# Patient Record
Sex: FEMALE | Race: ASIAN | NOT HISPANIC OR LATINO
[De-identification: names, ages, dates, MRNs, and addresses within clinical notes are randomized per-mention and may not be internally consistent; named-entity substitution may affect disease eponyms.]

---

## 2018-10-05 ENCOUNTER — RESULT REVIEW (OUTPATIENT)
Age: 28
End: 2018-10-05

## 2018-11-26 ENCOUNTER — TRANSCRIPTION ENCOUNTER (OUTPATIENT)
Age: 28
End: 2018-11-26

## 2020-03-11 ENCOUNTER — APPOINTMENT (OUTPATIENT)
Dept: OBGYN | Facility: CLINIC | Age: 30
End: 2020-03-11
Payer: COMMERCIAL

## 2020-03-11 VITALS
DIASTOLIC BLOOD PRESSURE: 78 MMHG | HEIGHT: 66 IN | SYSTOLIC BLOOD PRESSURE: 102 MMHG | WEIGHT: 115 LBS | BODY MASS INDEX: 18.48 KG/M2

## 2020-03-11 DIAGNOSIS — Z86.79 PERSONAL HISTORY OF OTHER DISEASES OF THE CIRCULATORY SYSTEM: ICD-10-CM

## 2020-03-11 DIAGNOSIS — Z72.3 LACK OF PHYSICAL EXERCISE: ICD-10-CM

## 2020-03-11 LAB
HCG UR QL: POSITIVE
QUALITY CONTROL: YES

## 2020-03-11 PROCEDURE — 99213 OFFICE O/P EST LOW 20 MIN: CPT | Mod: 25

## 2020-03-11 PROCEDURE — 99395 PREV VISIT EST AGE 18-39: CPT

## 2020-03-11 PROCEDURE — 81025 URINE PREGNANCY TEST: CPT

## 2020-03-11 NOTE — COUNSELING
[FreeTextEntry2] : Dietary and medicaiton restrictions were reviewed.  Pt and  oriented to OB practice. Call parameters were reviewed

## 2020-03-12 LAB
C TRACH RRNA SPEC QL NAA+PROBE: NOT DETECTED
HPV HIGH+LOW RISK DNA PNL CVX: NOT DETECTED
N GONORRHOEA RRNA SPEC QL NAA+PROBE: NOT DETECTED
SOURCE AMPLIFICATION: NORMAL
SOURCE TP AMPLIFICATION: NORMAL
T VAGINALIS RRNA SPEC QL NAA+PROBE: NOT DETECTED

## 2020-03-14 LAB — CYTOLOGY CVX/VAG DOC THIN PREP: NORMAL

## 2020-03-23 ENCOUNTER — APPOINTMENT (OUTPATIENT)
Dept: OBGYN | Facility: CLINIC | Age: 30
End: 2020-03-23
Payer: COMMERCIAL

## 2020-03-23 ENCOUNTER — ASOB RESULT (OUTPATIENT)
Age: 30
End: 2020-03-23

## 2020-03-23 ENCOUNTER — NON-APPOINTMENT (OUTPATIENT)
Age: 30
End: 2020-03-23

## 2020-03-23 VITALS
DIASTOLIC BLOOD PRESSURE: 64 MMHG | SYSTOLIC BLOOD PRESSURE: 126 MMHG | WEIGHT: 119 LBS | HEIGHT: 66 IN | BODY MASS INDEX: 19.13 KG/M2

## 2020-03-23 LAB
BILIRUB UR QL STRIP: NORMAL
CLARITY UR: CLEAR
COLLECTION METHOD: NORMAL
GLUCOSE BLDC GLUCOMTR-MCNC: 101
GLUCOSE UR-MCNC: ABNORMAL
HCG UR QL: 0.2 EU/DL
HGB UR QL STRIP.AUTO: NORMAL
KETONES UR-MCNC: NORMAL
LEUKOCYTE ESTERASE UR QL STRIP: NORMAL
NITRITE UR QL STRIP: NORMAL
PH UR STRIP: 5
PROT UR STRIP-MCNC: NORMAL
SP GR UR STRIP: 1

## 2020-03-23 PROCEDURE — 0500F INITIAL PRENATAL CARE VISIT: CPT

## 2020-03-23 PROCEDURE — 36415 COLL VENOUS BLD VENIPUNCTURE: CPT

## 2020-03-23 PROCEDURE — 81003 URINALYSIS AUTO W/O SCOPE: CPT | Mod: QW

## 2020-03-23 PROCEDURE — 82962 GLUCOSE BLOOD TEST: CPT

## 2020-03-23 PROCEDURE — 76817 TRANSVAGINAL US OBSTETRIC: CPT

## 2020-03-24 LAB
BASOPHILS # BLD AUTO: 0.03 K/UL
BASOPHILS NFR BLD AUTO: 0.4 %
EOSINOPHIL # BLD AUTO: 0.1 K/UL
EOSINOPHIL NFR BLD AUTO: 1.2 %
ESTIMATED AVERAGE GLUCOSE: 100 MG/DL
HBA1C MFR BLD HPLC: 5.1 %
HBV SURFACE AG SER QL: NONREACTIVE
HCT VFR BLD CALC: 41.3 %
HGB BLD-MCNC: 13.8 G/DL
HIV1+2 AB SPEC QL IA.RAPID: NONREACTIVE
IMM GRANULOCYTES NFR BLD AUTO: 0.1 %
LYMPHOCYTES # BLD AUTO: 2.46 K/UL
LYMPHOCYTES NFR BLD AUTO: 28.8 %
MAN DIFF?: NORMAL
MCHC RBC-ENTMCNC: 31.9 PG
MCHC RBC-ENTMCNC: 33.4 GM/DL
MCV RBC AUTO: 95.4 FL
MONOCYTES # BLD AUTO: 0.67 K/UL
MONOCYTES NFR BLD AUTO: 7.8 %
NEUTROPHILS # BLD AUTO: 5.28 K/UL
NEUTROPHILS NFR BLD AUTO: 61.7 %
PLATELET # BLD AUTO: 278 K/UL
RBC # BLD: 4.33 M/UL
RBC # FLD: 11.6 %
TSH SERPL-ACNC: 2.25 UIU/ML
WBC # FLD AUTO: 8.55 K/UL

## 2020-03-25 LAB
ABO + RH PNL BLD: NORMAL
B19V IGG SER QL IA: 3.8 INDEX
B19V IGG+IGM SER-IMP: NORMAL
B19V IGG+IGM SER-IMP: POSITIVE
B19V IGM FLD-ACNC: 0.1
B19V IGM SER-ACNC: NEGATIVE
BLD GP AB SCN SERPL QL: NORMAL
CMV IGG SERPL QL: 1.5 U/ML
CMV IGG SERPL-IMP: POSITIVE
CMV IGM SERPL QL: <8 AU/ML
CMV IGM SERPL QL: NEGATIVE
HGB A MFR BLD: 97.2 %
HGB A2 MFR BLD: 2.8 %
HGB FRACT BLD-IMP: NORMAL
MEV IGG FLD QL IA: >300 AU/ML
MEV IGG+IGM SER-IMP: POSITIVE
RUBV IGG FLD-ACNC: 1.2 INDEX
RUBV IGG SER-IMP: POSITIVE
T GONDII AB SER-IMP: NEGATIVE
T GONDII AB SER-IMP: NEGATIVE
T GONDII IGG SER QL: <3 IU/ML
T GONDII IGM SER QL: <3 AU/ML
T PALLIDUM AB SER QL IA: NEGATIVE

## 2020-03-30 LAB
AR GENE MUT ANL BLD/T: NEGATIVE
CFTR MUT TESTED BLD/T: NEGATIVE
FMR1 GENE MUT ANL BLD/T: NORMAL

## 2020-04-21 ENCOUNTER — APPOINTMENT (OUTPATIENT)
Dept: OBGYN | Facility: CLINIC | Age: 30
End: 2020-04-21
Payer: COMMERCIAL

## 2020-04-21 ENCOUNTER — NON-APPOINTMENT (OUTPATIENT)
Age: 30
End: 2020-04-21

## 2020-04-21 ENCOUNTER — ASOB RESULT (OUTPATIENT)
Age: 30
End: 2020-04-21

## 2020-04-21 VITALS
WEIGHT: 122 LBS | HEIGHT: 66 IN | SYSTOLIC BLOOD PRESSURE: 110 MMHG | DIASTOLIC BLOOD PRESSURE: 72 MMHG | BODY MASS INDEX: 19.61 KG/M2

## 2020-04-21 LAB
BILIRUB UR QL STRIP: NORMAL
CLARITY UR: CLEAR
COLLECTION METHOD: NORMAL
GLUCOSE UR-MCNC: NORMAL
HCG UR QL: 0.2 EU/DL
HGB UR QL STRIP.AUTO: NORMAL
KETONES UR-MCNC: NORMAL
LEUKOCYTE ESTERASE UR QL STRIP: ABNORMAL
NITRITE UR QL STRIP: NORMAL
PH UR STRIP: 5
PROT UR STRIP-MCNC: NORMAL
SP GR UR STRIP: 1

## 2020-04-21 PROCEDURE — 76813 OB US NUCHAL MEAS 1 GEST: CPT

## 2020-04-21 PROCEDURE — 36415 COLL VENOUS BLD VENIPUNCTURE: CPT

## 2020-04-21 PROCEDURE — 0502F SUBSEQUENT PRENATAL CARE: CPT

## 2020-04-24 ENCOUNTER — TRANSCRIPTION ENCOUNTER (OUTPATIENT)
Age: 30
End: 2020-04-24

## 2020-04-26 ENCOUNTER — MESSAGE (OUTPATIENT)
Age: 30
End: 2020-04-26

## 2020-04-29 LAB
1ST TRIMESTER DATA: NORMAL
ADDENDUM DOC: NORMAL
AFP PNL SERPL: NORMAL
AFP SERPL-ACNC: NORMAL
CLINICAL BIOCHEMIST REVIEW: NORMAL
FREE BETA HCG 1ST TRIMESTER: NORMAL
Lab: NORMAL
NASAL BONE: PRESENT
NOTES NTD: NORMAL
NT: NORMAL
PAPP-A SERPL-ACNC: NORMAL
TRISOMY 18/3: NORMAL

## 2020-05-01 ENCOUNTER — APPOINTMENT (OUTPATIENT)
Dept: DISASTER EMERGENCY | Facility: HOSPITAL | Age: 30
End: 2020-05-01

## 2020-05-07 ENCOUNTER — APPOINTMENT (OUTPATIENT)
Dept: MATERNAL FETAL MEDICINE | Facility: CLINIC | Age: 30
End: 2020-05-07
Payer: COMMERCIAL

## 2020-05-07 ENCOUNTER — APPOINTMENT (OUTPATIENT)
Dept: ANTEPARTUM | Facility: CLINIC | Age: 30
End: 2020-05-07

## 2020-05-07 VITALS
WEIGHT: 123.38 LBS | OXYGEN SATURATION: 98 % | SYSTOLIC BLOOD PRESSURE: 118 MMHG | BODY MASS INDEX: 19.83 KG/M2 | HEIGHT: 66 IN | RESPIRATION RATE: 16 BRPM | DIASTOLIC BLOOD PRESSURE: 68 MMHG | HEART RATE: 89 BPM

## 2020-05-07 VITALS — TEMPERATURE: 98.1 F

## 2020-05-07 PROCEDURE — 99203 OFFICE O/P NEW LOW 30 MIN: CPT

## 2020-05-07 RX ORDER — VITAMIN C, CALCIUM, IRON, VITAMIN D3, VITAMIN E, VITAMIN B1, VITAMIN B2, VITAMIN B3, VITAMIN B6, FOLIC ACID, IODINE, ZINC, COPPER, DOCUSATE SODIUM, DOCOSAHEXAENOIC ACID (DHA) 27-1-50 MG
KIT ORAL
Refills: 0 | Status: ACTIVE | COMMUNITY

## 2020-05-07 NOTE — DISCUSSION/SUMMARY
[FreeTextEntry1] : Medina is a 30-year-old  being seen at 15 weeks with chronic hypertension. Patient was diagnosed approximately 12 years ago and had been on Norvasc prior to pregnancy. Labetalol was tried with side effects and she is on Procardia 30 mg XL daily.\par \par Ultrasound and first trimester testing was performed on  and revealed low risk for genetic abnormalities and ultrasound finding of size consistent with dates. Vital signs today revealed a blood pressure of 118/68, maternal weight is 123.6 pounds which is a 4 1/2 pound weight gain from the evaluation done on , consistent with a BMI of 19.91 KG.\par \par Evaluation of the patient's blood pressure on multiple visits to your office showed her blood pressure running between 102-126/64-78. During pregnancy in patient with chronic hypertension blood pressures should be between 120 140/80-90. We will discontinue her Procardia 30 mg XL daily and start her on 10 mg of Procardia twice a day. She will call the office next week with her blood pressure values done with adjustments made if clinically indicated.  Baseline blood work including CBC with platelet count, uric acid, liver function tests and urine creatinine protein ratio should be done at approximately 20 weeks. A comprehensive ultrasound at 20 weeks is also recommended. Serial growth scans every 4-6 weeks after the comprehensive ultrasound will be recommended. The patient understands that she is at increased risk for poor fetal growth. In addition the possibility of superimposed preeclampsia was discussed with possible  delivery with associated morbidity and mortaliy. The option of low dose aspirin was also discussed. Low dose ASA has been studied in patients with history of preeclampsia with associated  delivery. The efficacy of this medication in a patient with this clinical history is questionable. Patient states she may take one low dose aspirin daily anyway and if she should start that medication discontinuation at approximately 38 weeks would be recommended. Signs and symptoms of superimposed preeclampsia were discussed with the patient and will be reinforced at the end of the pregnancy. All of the above was discussed with the patient and all questions were answered.\par \par The patient is adopted. She has had LASIK eye surgery. She has no known allergies to medications and denies alcohol, tobacco or drug use.\par \par I spent a total of 30 minutes of which greater than 50% was counseling and coordinating care.\par \par Recommendations;\par \par #1. Procardia 10 mg p.o. q.12 h.\par #2. Baseline blood work and urine creatinine protein ratio 20 weeks is recommended.\par #3. Comprehensive ultrasound at 20 weeks is recommended.\par #4. Serial growth scans every 4-6 weeks after the comprehensive ultrasound was recommended.\par #5. Three-hour glucose tolerance test between 24 and 28 weeks secondary to unknown family history.\par #6. Followup maternal fetal medicine consultation at 28 weeks is recommended.

## 2020-05-19 ENCOUNTER — APPOINTMENT (OUTPATIENT)
Dept: OBGYN | Facility: CLINIC | Age: 30
End: 2020-05-19
Payer: COMMERCIAL

## 2020-05-19 ENCOUNTER — NON-APPOINTMENT (OUTPATIENT)
Age: 30
End: 2020-05-19

## 2020-05-19 VITALS
HEIGHT: 66 IN | WEIGHT: 126 LBS | DIASTOLIC BLOOD PRESSURE: 80 MMHG | BODY MASS INDEX: 20.25 KG/M2 | SYSTOLIC BLOOD PRESSURE: 118 MMHG

## 2020-05-19 LAB
BILIRUB UR QL STRIP: NORMAL
GLUCOSE UR-MCNC: NORMAL
HCG UR QL: 0.2 EU/DL
HGB UR QL STRIP.AUTO: NORMAL
KETONES UR-MCNC: NORMAL
LEUKOCYTE ESTERASE UR QL STRIP: ABNORMAL
NITRITE UR QL STRIP: NORMAL
PH UR STRIP: 5
PROT UR STRIP-MCNC: NORMAL
SP GR UR STRIP: 1

## 2020-05-19 PROCEDURE — 0502F SUBSEQUENT PRENATAL CARE: CPT

## 2020-05-19 PROCEDURE — 36415 COLL VENOUS BLD VENIPUNCTURE: CPT

## 2020-05-29 LAB
1ST TRIMESTER DATA: NORMAL
2ND TRIMESTER DATA: NORMAL
AFP PNL SERPL: NORMAL
AFP SERPL-ACNC: NORMAL
AFP SERPL-ACNC: NORMAL
B-HCG FREE SERPL-MCNC: NORMAL
CLINICAL BIOCHEMIST REVIEW: NORMAL
FREE BETA HCG 1ST TRIMESTER: NORMAL
INHIBIN A SERPL-MCNC: NORMAL
NASAL BONE: PRESENT
NOTES NTD: NORMAL
NT: NORMAL
PAPP-A SERPL-ACNC: NORMAL
U ESTRIOL SERPL-SCNC: NORMAL

## 2020-06-08 ENCOUNTER — ASOB RESULT (OUTPATIENT)
Age: 30
End: 2020-06-08

## 2020-06-08 ENCOUNTER — APPOINTMENT (OUTPATIENT)
Dept: ANTEPARTUM | Facility: CLINIC | Age: 30
End: 2020-06-08
Payer: COMMERCIAL

## 2020-06-08 PROCEDURE — 76811 OB US DETAILED SNGL FETUS: CPT

## 2020-06-16 ENCOUNTER — APPOINTMENT (OUTPATIENT)
Dept: OBGYN | Facility: CLINIC | Age: 30
End: 2020-06-16
Payer: COMMERCIAL

## 2020-06-16 VITALS
DIASTOLIC BLOOD PRESSURE: 72 MMHG | SYSTOLIC BLOOD PRESSURE: 102 MMHG | WEIGHT: 133 LBS | HEIGHT: 66 IN | BODY MASS INDEX: 21.38 KG/M2

## 2020-06-16 LAB
SARS-COV-2 IGG SERPL IA-ACNC: <0.1 INDEX
SARS-COV-2 IGG SERPL QL IA: NEGATIVE

## 2020-06-16 PROCEDURE — 0502F SUBSEQUENT PRENATAL CARE: CPT

## 2020-06-27 LAB
BILIRUB UR QL STRIP: NORMAL
COLLECTION METHOD: NORMAL
GLUCOSE UR-MCNC: NORMAL
HCG UR QL: 0.2 EU/DL
HGB UR QL STRIP.AUTO: NORMAL
KETONES UR-MCNC: ABNORMAL
LEUKOCYTE ESTERASE UR QL STRIP: ABNORMAL
NITRITE UR QL STRIP: NORMAL
PH UR STRIP: 5.5
PROT UR STRIP-MCNC: NORMAL
SP GR UR STRIP: 1.01

## 2020-07-17 ENCOUNTER — NON-APPOINTMENT (OUTPATIENT)
Age: 30
End: 2020-07-17

## 2020-07-17 ENCOUNTER — APPOINTMENT (OUTPATIENT)
Dept: OBGYN | Facility: CLINIC | Age: 30
End: 2020-07-17
Payer: COMMERCIAL

## 2020-07-17 VITALS
DIASTOLIC BLOOD PRESSURE: 64 MMHG | SYSTOLIC BLOOD PRESSURE: 118 MMHG | HEIGHT: 66 IN | BODY MASS INDEX: 21.69 KG/M2 | WEIGHT: 135 LBS

## 2020-07-17 PROCEDURE — 0502F SUBSEQUENT PRENATAL CARE: CPT

## 2020-08-10 ENCOUNTER — APPOINTMENT (OUTPATIENT)
Dept: ANTEPARTUM | Facility: CLINIC | Age: 30
End: 2020-08-10

## 2020-08-17 ENCOUNTER — APPOINTMENT (OUTPATIENT)
Dept: ANTEPARTUM | Facility: CLINIC | Age: 30
End: 2020-08-17
Payer: COMMERCIAL

## 2020-08-17 ENCOUNTER — APPOINTMENT (OUTPATIENT)
Dept: OBGYN | Facility: CLINIC | Age: 30
End: 2020-08-17
Payer: COMMERCIAL

## 2020-08-17 ENCOUNTER — ASOB RESULT (OUTPATIENT)
Age: 30
End: 2020-08-17

## 2020-08-17 ENCOUNTER — NON-APPOINTMENT (OUTPATIENT)
Age: 30
End: 2020-08-17

## 2020-08-17 VITALS
BODY MASS INDEX: 22.98 KG/M2 | DIASTOLIC BLOOD PRESSURE: 64 MMHG | SYSTOLIC BLOOD PRESSURE: 120 MMHG | WEIGHT: 143 LBS | HEIGHT: 66 IN

## 2020-08-17 DIAGNOSIS — Z34.01 ENCOUNTER FOR SUPERVISION OF NORMAL FIRST PREGNANCY, FIRST TRIMESTER: ICD-10-CM

## 2020-08-17 DIAGNOSIS — Z3A.13 13 WEEKS GESTATION OF PREGNANCY: ICD-10-CM

## 2020-08-17 LAB
BILIRUB UR QL STRIP: NORMAL
COLLECTION METHOD: NORMAL
GLUCOSE UR-MCNC: NORMAL
HCG UR QL: 0.2 EU/DL
HGB UR QL STRIP.AUTO: NORMAL
KETONES UR-MCNC: NORMAL
LEUKOCYTE ESTERASE UR QL STRIP: ABNORMAL
NITRITE UR QL STRIP: NORMAL
PH UR STRIP: 7
PROT UR STRIP-MCNC: NORMAL
SP GR UR STRIP: 1.01

## 2020-08-17 PROCEDURE — 76816 OB US FOLLOW-UP PER FETUS: CPT

## 2020-08-17 PROCEDURE — 81002 URINALYSIS NONAUTO W/O SCOPE: CPT | Mod: NC

## 2020-08-17 PROCEDURE — 0502F SUBSEQUENT PRENATAL CARE: CPT

## 2020-08-17 PROCEDURE — 76820 UMBILICAL ARTERY ECHO: CPT

## 2020-08-17 PROCEDURE — 36415 COLL VENOUS BLD VENIPUNCTURE: CPT

## 2020-08-17 PROCEDURE — 93976 VASCULAR STUDY: CPT

## 2020-08-18 ENCOUNTER — NON-APPOINTMENT (OUTPATIENT)
Age: 30
End: 2020-08-18

## 2020-08-18 LAB
ALBUMIN SERPL ELPH-MCNC: 3.8 G/DL
ALP BLD-CCNC: 57 U/L
ALT SERPL-CCNC: 14 U/L
ANION GAP SERPL CALC-SCNC: 14 MMOL/L
AST SERPL-CCNC: 15 U/L
BASOPHILS # BLD AUTO: 0.01 K/UL
BASOPHILS NFR BLD AUTO: 0.1 %
BILIRUB SERPL-MCNC: <0.2 MG/DL
BUN SERPL-MCNC: 8 MG/DL
CALCIUM SERPL-MCNC: 8.8 MG/DL
CHLORIDE SERPL-SCNC: 101 MMOL/L
CO2 SERPL-SCNC: 23 MMOL/L
CREAT SERPL-MCNC: 0.48 MG/DL
EOSINOPHIL # BLD AUTO: 0.04 K/UL
EOSINOPHIL NFR BLD AUTO: 0.5 %
GLUCOSE 1H P 50 G GLC PO SERPL-MCNC: 119 MG/DL
GLUCOSE SERPL-MCNC: 91 MG/DL
HCT VFR BLD CALC: 39.1 %
HGB BLD-MCNC: 12.4 G/DL
IMM GRANULOCYTES NFR BLD AUTO: 1 %
LDH SERPL-CCNC: 207 U/L
LYMPHOCYTES # BLD AUTO: 1.4 K/UL
LYMPHOCYTES NFR BLD AUTO: 17 %
MAN DIFF?: NORMAL
MCHC RBC-ENTMCNC: 31.7 GM/DL
MCHC RBC-ENTMCNC: 32.2 PG
MCV RBC AUTO: 101.6 FL
MONOCYTES # BLD AUTO: 0.63 K/UL
MONOCYTES NFR BLD AUTO: 7.7 %
NEUTROPHILS # BLD AUTO: 6.06 K/UL
NEUTROPHILS NFR BLD AUTO: 73.7 %
PLATELET # BLD AUTO: 234 K/UL
POTASSIUM SERPL-SCNC: 4.1 MMOL/L
PROT SERPL-MCNC: 5.9 G/DL
RBC # BLD: 3.85 M/UL
RBC # FLD: 12.2 %
SODIUM SERPL-SCNC: 138 MMOL/L
URATE SERPL-MCNC: 1.6 MG/DL
WBC # FLD AUTO: 8.22 K/UL

## 2020-08-19 LAB
CREAT SPEC-SCNC: 9 MG/DL
CREAT/PROT UR: 0.7 RATIO
PROT UR-MCNC: 7 MG/DL

## 2020-08-27 LAB
CREAT 24H UR-MCNC: 0.4 G/24 H
CREAT ?TM UR-MCNC: 29 MG/DL
PROT 24H UR-MRATE: 8 MG/DL
PROT ?TM UR-MCNC: 24 HR
PROT UR-MCNC: 108 MG/24 H
SPECIMEN VOL 24H UR: 1350 ML

## 2020-08-31 ENCOUNTER — APPOINTMENT (OUTPATIENT)
Dept: OBGYN | Facility: CLINIC | Age: 30
End: 2020-08-31
Payer: COMMERCIAL

## 2020-08-31 ENCOUNTER — NON-APPOINTMENT (OUTPATIENT)
Age: 30
End: 2020-08-31

## 2020-08-31 VITALS
DIASTOLIC BLOOD PRESSURE: 64 MMHG | SYSTOLIC BLOOD PRESSURE: 110 MMHG | WEIGHT: 146 LBS | BODY MASS INDEX: 23.46 KG/M2 | HEIGHT: 66 IN

## 2020-08-31 LAB
BILIRUB UR QL STRIP: NORMAL
GLUCOSE UR-MCNC: NORMAL
HCG UR QL: 0.2 EU/DL
HGB UR QL STRIP.AUTO: NORMAL
KETONES UR-MCNC: NORMAL
LEUKOCYTE ESTERASE UR QL STRIP: NORMAL
NITRITE UR QL STRIP: NORMAL
PH UR STRIP: 7
PROT UR STRIP-MCNC: NORMAL
SP GR UR STRIP: 1.01

## 2020-08-31 PROCEDURE — 90715 TDAP VACCINE 7 YRS/> IM: CPT

## 2020-08-31 PROCEDURE — 0502F SUBSEQUENT PRENATAL CARE: CPT

## 2020-08-31 PROCEDURE — 90471 IMMUNIZATION ADMIN: CPT

## 2020-09-08 LAB
BILIRUB UR QL STRIP: NORMAL
GLUCOSE UR-MCNC: NORMAL
HCG UR QL: 0.2 EU/DL
HGB UR QL STRIP.AUTO: NORMAL
KETONES UR-MCNC: NORMAL
LEUKOCYTE ESTERASE UR QL STRIP: ABNORMAL
NITRITE UR QL STRIP: NORMAL
PH UR STRIP: 8
PROT UR STRIP-MCNC: NORMAL
SP GR UR STRIP: 1.01

## 2020-09-15 ENCOUNTER — APPOINTMENT (OUTPATIENT)
Dept: OBGYN | Facility: CLINIC | Age: 30
End: 2020-09-15
Payer: COMMERCIAL

## 2020-09-15 ENCOUNTER — NON-APPOINTMENT (OUTPATIENT)
Age: 30
End: 2020-09-15

## 2020-09-15 VITALS
WEIGHT: 150 LBS | BODY MASS INDEX: 24.11 KG/M2 | SYSTOLIC BLOOD PRESSURE: 122 MMHG | DIASTOLIC BLOOD PRESSURE: 64 MMHG | HEIGHT: 66 IN

## 2020-09-15 LAB
BILIRUB UR QL STRIP: NORMAL
GLUCOSE BLDC GLUCOMTR-MCNC: 99
GLUCOSE UR-MCNC: ABNORMAL
HCG UR QL: 0.2 EU/DL
HGB UR QL STRIP.AUTO: NORMAL
KETONES UR-MCNC: ABNORMAL
LEUKOCYTE ESTERASE UR QL STRIP: ABNORMAL
NITRITE UR QL STRIP: NORMAL
PH UR STRIP: 6
PROT UR STRIP-MCNC: NORMAL
SP GR UR STRIP: 1.02

## 2020-09-15 PROCEDURE — 0502F SUBSEQUENT PRENATAL CARE: CPT

## 2020-09-15 PROCEDURE — 82962 GLUCOSE BLOOD TEST: CPT

## 2020-09-16 ENCOUNTER — ASOB RESULT (OUTPATIENT)
Age: 30
End: 2020-09-16

## 2020-09-16 ENCOUNTER — APPOINTMENT (OUTPATIENT)
Dept: ANTEPARTUM | Facility: CLINIC | Age: 30
End: 2020-09-16
Payer: COMMERCIAL

## 2020-09-16 ENCOUNTER — APPOINTMENT (OUTPATIENT)
Dept: MATERNAL FETAL MEDICINE | Facility: CLINIC | Age: 30
End: 2020-09-16
Payer: COMMERCIAL

## 2020-09-16 VITALS
SYSTOLIC BLOOD PRESSURE: 118 MMHG | HEART RATE: 82 BPM | RESPIRATION RATE: 16 BRPM | HEIGHT: 66 IN | WEIGHT: 150.38 LBS | BODY MASS INDEX: 24.17 KG/M2 | DIASTOLIC BLOOD PRESSURE: 70 MMHG

## 2020-09-16 VITALS
BODY MASS INDEX: 24.17 KG/M2 | SYSTOLIC BLOOD PRESSURE: 118 MMHG | HEART RATE: 82 BPM | RESPIRATION RATE: 16 BRPM | DIASTOLIC BLOOD PRESSURE: 70 MMHG | OXYGEN SATURATION: 98 % | HEIGHT: 66 IN | WEIGHT: 150.38 LBS

## 2020-09-16 PROCEDURE — 99214 OFFICE O/P EST MOD 30 MIN: CPT

## 2020-09-16 PROCEDURE — 93976 VASCULAR STUDY: CPT

## 2020-09-16 PROCEDURE — 76819 FETAL BIOPHYS PROFIL W/O NST: CPT

## 2020-09-16 PROCEDURE — 76816 OB US FOLLOW-UP PER FETUS: CPT

## 2020-09-16 PROCEDURE — 76821 MIDDLE CEREBRAL ARTERY ECHO: CPT

## 2020-09-16 PROCEDURE — 76820 UMBILICAL ARTERY ECHO: CPT

## 2020-09-16 NOTE — DISCUSSION/SUMMARY
[FreeTextEntry1] : She is 34 weeks and one-day gestation by her last menstrual period dates.\par \par She has chronic hypertension, and she is taking nifedipine 10 mg twice daily. I told her that she is at risk for developing superimposed pre-eclampsia and fetal growth restriction. I reviewed the latest hypertension laboratory baseline studies such as:  CBC, platelets, PT/PTT, fibrinogen, serum creatinine, uric acid, LDH, and CMP (liver function tests, BUN, creatinine) and 24 hour urine collection for protein.  All test results were WNL for pregnancy.   I told her that anti-hypertensive medication should be increased if her systolic blood pressure is equal or greater than 150 or her diastolic blood pressure is equal or greater than 100.  She denies having headache, epigastric pain, and visual disturbances. She has been performing self blood pressure monitoring. She did not bring the blood pressure diary. She told me that her BP readings at home range between 116 - 126 systolic and 70 - 74 diastolic.  She was advised to call your office, or go to the hospital if her systolic blood pressure is equal or greater than 150 or her diastolic blood pressure is equal or greater than 100.  I told her that I recommend maintaining the blood pressures between 120 - 150 systolic / 80 - 100 diastolic.  She was scheduled to have weekly fetal testing until delivery. She can also perform daily fetal movement counts as an adjunct to the NSTs or BPPs.\par \par Regarding her chronic hypertension that is being treated with antihypertensive medication and the timing of delivery, I told her that she is at risk for adverse pregnancy outcomes such as superimposed preeclampsia and stillbirth. Therefore, an early term delivery is recommended between 37 0/7 and 38 6/7 weeks of gestation (ACOG Committee Opinion No.764, February 2019).\par \par

## 2020-09-16 NOTE — ACTIVE PROBLEMS
[Diabetes Mellitus] : no diabetes mellitus [Hypertension] : no hypertension [Heart Disease] : no heart disease [Renal Disease] : no kidney disease, no UTI [Autoimmune Disease] : no autoimmune disease [Neurologic Disorder] : no neurologic disorder, no epilepsy [Psychiatric Disorders] : no psychiatric disorders [Depression] : no depression, no post partum depression [Hepatic Disorder] : no hepatitis, no liver disease [Thrombophlebitis] : no varicosities, no phlebitis [Thyroid Disorder] : no thyroid dysfunction [Trauma] : no trauma/violence [Blood Transfusion (___ Ml)] : no history of blood transfusion

## 2020-09-16 NOTE — OB HISTORY
[LMP: ___] : LMP: [unfilled] [KATALINA: ___] : KATALINA: [unfilled] [Sonogram] : sonogram [Spontaneous] : Spontaneous conception [at ___ wks] : at [unfilled] weeks [Definite:  ___ (Date)] : the last menstrual period was [unfilled] [Normal Amount/Duration] : was of a normal amount and duration [Regular Cycle Intervals] : periods have been regular [Frequency: Q ___ days] : menstrual periods occur approximately every [unfilled] days [Menarche Age: ____] : age at menarche was [unfilled] [Menstrual Cramps] : menstrual cramps [EGA: ___ wks] : EGA: [unfilled] wks [Spotting Between  Menses] : no spotting between menses [FreeTextEntry1] : Her first prenatal visit was March 23, 2020.\par \par She had a maternal fetal medicine consultation in our office on May 7, 2020 because of her chronic hypertension. She was taking Norvasc 10 mg once daily to treat her hypertension before pregnancy. She was switched to labetalol  100 mg bid when she became pregnant. She developed labetalol side effects and was placed on nifedipine medication. She currently takes nifedipine 10 mg twice daily. [On BCP at conception] : the patient was not on BCP at conception

## 2020-09-16 NOTE — VITALS
[LMP (date): ___] : LMP was on [unfilled] [KATALINA by LMP (date): ___] : The calculated KATALINA by LMP is [unfilled] [By LMP] : this is the final KATALINA [GA =___ Weeks] : which calculates to a GA of [unfilled] weeks [GA= ___ Days] : and [unfilled] day(s)

## 2020-09-16 NOTE — PAST MEDICAL HISTORY
[HIV Infection] : no HIV [Exposure To Gonorrhea] : no gonorrhea [Syphilis] : no syphilis [Chlamydial Infections] : no chlamydia [Hepatitis, B Virus] : no Hepatitis B [Herpes Simplex] : no genital herpes [Human Papilloma Virus Infection] : no genital warts [Trichomoniasis] : no trichomoniasis [Hepatitis, C Virus] : no Hepatitis C

## 2020-09-16 NOTE — FAMILY HISTORY
[Age 35+ During Pregnancy] : not 35 or over during pregnancy [Reported Family History Of Birth Defects] : no congenital heart defects [Piyush-Sachs Carrier] : no Piyush-Sachs [Family History] : no mental retardation/autism [Reported Family History Of Genetic Disease] : no history of child defect in child of baby father

## 2020-09-23 ENCOUNTER — APPOINTMENT (OUTPATIENT)
Dept: ANTEPARTUM | Facility: CLINIC | Age: 30
End: 2020-09-23
Payer: COMMERCIAL

## 2020-09-23 ENCOUNTER — ASOB RESULT (OUTPATIENT)
Age: 30
End: 2020-09-23

## 2020-09-23 PROCEDURE — 93976 VASCULAR STUDY: CPT

## 2020-09-23 PROCEDURE — 76818 FETAL BIOPHYS PROFILE W/NST: CPT

## 2020-09-23 PROCEDURE — 76820 UMBILICAL ARTERY ECHO: CPT

## 2020-09-29 ENCOUNTER — APPOINTMENT (OUTPATIENT)
Dept: OBGYN | Facility: CLINIC | Age: 30
End: 2020-09-29
Payer: COMMERCIAL

## 2020-09-29 ENCOUNTER — NON-APPOINTMENT (OUTPATIENT)
Age: 30
End: 2020-09-29

## 2020-09-29 VITALS
HEIGHT: 66 IN | WEIGHT: 155 LBS | DIASTOLIC BLOOD PRESSURE: 90 MMHG | SYSTOLIC BLOOD PRESSURE: 120 MMHG | BODY MASS INDEX: 24.91 KG/M2

## 2020-09-29 PROCEDURE — 36415 COLL VENOUS BLD VENIPUNCTURE: CPT

## 2020-09-29 PROCEDURE — 0502F SUBSEQUENT PRENATAL CARE: CPT

## 2020-09-30 ENCOUNTER — APPOINTMENT (OUTPATIENT)
Dept: ANTEPARTUM | Facility: CLINIC | Age: 30
End: 2020-09-30
Payer: COMMERCIAL

## 2020-09-30 ENCOUNTER — ASOB RESULT (OUTPATIENT)
Age: 30
End: 2020-09-30

## 2020-09-30 LAB
BILIRUB UR QL STRIP: NORMAL
GLUCOSE UR-MCNC: NORMAL
HCG UR QL: 0.2 EU/DL
HGB UR QL STRIP.AUTO: NORMAL
KETONES UR-MCNC: NORMAL
LEUKOCYTE ESTERASE UR QL STRIP: ABNORMAL
NITRITE UR QL STRIP: NORMAL
PH UR STRIP: 7
PROT UR STRIP-MCNC: NORMAL
SP GR UR STRIP: 1.01

## 2020-09-30 PROCEDURE — 76818 FETAL BIOPHYS PROFILE W/NST: CPT

## 2020-09-30 PROCEDURE — 76820 UMBILICAL ARTERY ECHO: CPT

## 2020-09-30 PROCEDURE — 93976 VASCULAR STUDY: CPT

## 2020-10-02 ENCOUNTER — NON-APPOINTMENT (OUTPATIENT)
Age: 30
End: 2020-10-02

## 2020-10-02 LAB — B-HEM STREP SPEC QL CULT: NORMAL

## 2020-10-05 ENCOUNTER — OUTPATIENT (OUTPATIENT)
Dept: INPATIENT UNIT | Facility: HOSPITAL | Age: 30
LOS: 1 days | End: 2020-10-05
Payer: COMMERCIAL

## 2020-10-05 ENCOUNTER — APPOINTMENT (OUTPATIENT)
Dept: OBGYN | Facility: CLINIC | Age: 30
End: 2020-10-05
Payer: COMMERCIAL

## 2020-10-05 ENCOUNTER — NON-APPOINTMENT (OUTPATIENT)
Age: 30
End: 2020-10-05

## 2020-10-05 VITALS
DIASTOLIC BLOOD PRESSURE: 68 MMHG | BODY MASS INDEX: 24.91 KG/M2 | HEIGHT: 66 IN | SYSTOLIC BLOOD PRESSURE: 120 MMHG | WEIGHT: 155 LBS

## 2020-10-05 VITALS — DIASTOLIC BLOOD PRESSURE: 74 MMHG | HEART RATE: 77 BPM | SYSTOLIC BLOOD PRESSURE: 114 MMHG

## 2020-10-05 VITALS
TEMPERATURE: 98 F | HEART RATE: 92 BPM | SYSTOLIC BLOOD PRESSURE: 124 MMHG | DIASTOLIC BLOOD PRESSURE: 68 MMHG | RESPIRATION RATE: 18 BRPM

## 2020-10-05 DIAGNOSIS — O47.1 FALSE LABOR AT OR AFTER 37 COMPLETED WEEKS OF GESTATION: ICD-10-CM

## 2020-10-05 DIAGNOSIS — Z98.890 OTHER SPECIFIED POSTPROCEDURAL STATES: Chronic | ICD-10-CM

## 2020-10-05 PROCEDURE — 59025 FETAL NON-STRESS TEST: CPT

## 2020-10-05 PROCEDURE — 0502F SUBSEQUENT PRENATAL CARE: CPT

## 2020-10-05 PROCEDURE — 36415 COLL VENOUS BLD VENIPUNCTURE: CPT

## 2020-10-05 PROCEDURE — G0463: CPT

## 2020-10-05 PROCEDURE — 81002 URINALYSIS NONAUTO W/O SCOPE: CPT | Mod: NC

## 2020-10-05 RX ORDER — SODIUM CHLORIDE 9 MG/ML
1000 INJECTION, SOLUTION INTRAVENOUS
Refills: 0 | Status: DISCONTINUED | OUTPATIENT
Start: 2020-10-05 | End: 2020-10-20

## 2020-10-05 RX ADMIN — SODIUM CHLORIDE 999 MILLILITER(S): 9 INJECTION, SOLUTION INTRAVENOUS at 20:00

## 2020-10-05 NOTE — OB PROVIDER TRIAGE NOTE - HISTORY OF PRESENT ILLNESS
31 y/o  at 36w6d with known chronic hypertension in pregnancy presenting from the office where she was noted to have a non-reassuring fetal heart tracing, tachycardic baseline with variable decelerations. Denies any vaginal bleeding, leakage of fluid, and lower abdominal pain/cramping. +FM   Prenatal course significant for:   1. Chronic hypertension: well controlled on Procardia 10mg BID, followed by MFM     ObHx: denies   GynHx: denies history of abnormal pap smears, fibroids, endometriosis, or ovarian cysts; denies history of STD's   Meds: PNV, Procardia 10mg BID   Allergies: NKDA  SocHx: denies use of EtOH, illicit trugs, or tobacco during this pregnancy or prior; denies any hx of anxiety or depression; feels safe at home 29 y/o  at 36w6d with known chronic hypertension in pregnancy presenting from the office where she was noted to have a non-reassuring fetal heart tracing, tachycardic baseline with variable decelerations. Denies any vaginal bleeding, leakage of fluid, and lower abdominal pain/cramping. +FM   Prenatal course significant for:   1. Chronic hypertension: well controlled on Procardia 10mg BID, followed by MFM     ObHx: denies   GynHx: denies history of abnormal pap smears, fibroids, endometriosis, or ovarian cysts; denies history of STD's   Meds: PNV, Procardia 10mg BID   Allergies: NKDA  SocHx: denies use of EtOH, illicit trugs, or tobacco during this pregnancy or prior; denies any hx of anxiety or depression; feels safe at home

## 2020-10-05 NOTE — OB PROVIDER TRIAGE NOTE - NSOBPROVIDERNOTE_OBGYN_ALL_OB_FT
29 y/o  at 36w6d evaluated for NRFHT in the office, found to be kimberly regularly during triage.   1. NRFHT   - FHR Cat I, reactive during triage time   - no OB complaints     2. r/o labor   - kimberly regularly  - will give IV fluid hydration   - pelvic exam pending     d/w Dr. Herrera 29 y/o  at 36w6d evaluated for NRFHT in the office, found to be kimberly regularly during triage.   1. NRFHT   - FHR Cat I, reactive during triage time   - no OB complaints     2. Rule out labor   -Denies lower abdominal pain and lower back pain   -Previously kimberly regularly, contractions have now spaced out after IV fluid hydration   -SVE /-3, same exam from the office 1 week ago    Dispo: Patient is stable for discharge to home with outpatient follow up. Term labor precautions given and patient verbalized understanding.     Discussed with Dr. Herrera.

## 2020-10-05 NOTE — OB PROVIDER TRIAGE NOTE - NSHPPHYSICALEXAM_GEN_ALL_CORE
Vital Signs Last 24 Hrs  T(C): 36.8 (05 Oct 2020 19:14), Max: 36.8 (05 Oct 2020 19:14)  T(F): 98.2 (05 Oct 2020 19:14), Max: 98.2 (05 Oct 2020 19:14)  HR: 92 (05 Oct 2020 19:17) (92 - 92)  BP: 124/68 (05 Oct 2020 19:17) (124/68 - 124/68)  RR: 18 (05 Oct 2020 19:14) (18 - 18)    Gen: NAD  Cardio: RRR  Lungs: CTAB   Abdomen: gravid, nontender to palpation  Ext: nontender lower extremities bilaterally   SVE:     FHR: baseline 145, moderate variability, +accels, no decels  [place]: kimberly regularly q2-5m Vital Signs Last 24 Hrs  T(C): 36.8 (05 Oct 2020 19:14), Max: 36.8 (05 Oct 2020 19:14)  T(F): 98.2 (05 Oct 2020 19:14), Max: 98.2 (05 Oct 2020 19:14)  HR: 92 (05 Oct 2020 19:17) (92 - 92)  BP: 124/68 (05 Oct 2020 19:17) (124/68 - 124/68)  RR: 18 (05 Oct 2020 19:14) (18 - 18)    Gen: NAD  Cardio: RRR  Lungs: CTAB   Abdomen: gravid, nontender to palpation  Ext: nontender lower extremities bilaterally   SVE: 2/50/-3    FHR: baseline 145, moderate variability, +accels, no decels  [place]: kimberly regularly q2-5m

## 2020-10-06 ENCOUNTER — NON-APPOINTMENT (OUTPATIENT)
Age: 30
End: 2020-10-06

## 2020-10-06 LAB
BILIRUB UR QL STRIP: NORMAL
GLUCOSE UR-MCNC: NORMAL
HCG UR QL: 0.2 EU/DL
HGB UR QL STRIP.AUTO: NORMAL
HIV1+2 AB SPEC QL IA.RAPID: NONREACTIVE
KETONES UR-MCNC: NORMAL
LEUKOCYTE ESTERASE UR QL STRIP: NORMAL
NITRITE UR QL STRIP: NORMAL
PH UR STRIP: 6
PROT UR STRIP-MCNC: NORMAL
SP GR UR STRIP: 1.01

## 2020-10-07 ENCOUNTER — APPOINTMENT (OUTPATIENT)
Dept: ANTEPARTUM | Facility: CLINIC | Age: 30
End: 2020-10-07

## 2020-10-07 LAB
MEV IGG FLD QL IA: >300 AU/ML
MEV IGG+IGM SER-IMP: POSITIVE

## 2020-10-11 ENCOUNTER — OUTPATIENT (OUTPATIENT)
Dept: OUTPATIENT SERVICES | Facility: HOSPITAL | Age: 30
LOS: 1 days | End: 2020-10-11
Payer: COMMERCIAL

## 2020-10-11 DIAGNOSIS — Z11.59 ENCOUNTER FOR SCREENING FOR OTHER VIRAL DISEASES: ICD-10-CM

## 2020-10-11 DIAGNOSIS — Z98.890 OTHER SPECIFIED POSTPROCEDURAL STATES: Chronic | ICD-10-CM

## 2020-10-11 PROBLEM — I10 ESSENTIAL (PRIMARY) HYPERTENSION: Chronic | Status: ACTIVE | Noted: 2020-10-05

## 2020-10-11 LAB — SARS-COV-2 RNA SPEC QL NAA+PROBE: SIGNIFICANT CHANGE UP

## 2020-10-11 PROCEDURE — U0003: CPT

## 2020-10-12 ENCOUNTER — APPOINTMENT (OUTPATIENT)
Dept: OBGYN | Facility: CLINIC | Age: 30
End: 2020-10-12

## 2020-10-12 RX ORDER — OXYTOCIN 10 UNIT/ML
333.33 VIAL (ML) INJECTION
Qty: 20 | Refills: 0 | Status: DISCONTINUED | OUTPATIENT
Start: 2020-10-13 | End: 2020-10-14

## 2020-10-12 RX ORDER — SODIUM CHLORIDE 9 MG/ML
1000 INJECTION, SOLUTION INTRAVENOUS
Refills: 0 | Status: DISCONTINUED | OUTPATIENT
Start: 2020-10-13 | End: 2020-10-15

## 2020-10-12 NOTE — OB PROVIDER H&P - ATTENDING COMMENTS
Patient seen and examined with me.  Agree with details of resident note.  30 year old female  at 38 weeks for IOL due to chronic HTN.  VSS.  , moderate variability, + accels, no decels.  [place] with ctx q 2 minutes.  VE: 2/50/-3.  Patient not feeling contractions but at this time unable to start induction meds due to frequency of contractions.  Will give IV fluid bolus and reassess.

## 2020-10-12 NOTE — OB PROVIDER H&P - ASSESSMENT
Patient is a 30 year old  at 38w who presents to L&D for IOL in the setting of cHTN.  - Admit to L&D  - Consent  - Admission labs  - IV fluids   - Continuous toco and fetal monitoring   - GBS neg   - DVT prophylaxis      Patient is a 30 year old  at 38w who presents to L&D for IOL in the setting of cHTN.  - Admit to L&D  - Consent  - Admission labs, PIH labs, COVID swab   - IV fluids   - Continuous toco and fetal monitoring   - GBS neg, no antibiotic prophylaxis indicated   - DVT prophylaxis   - kimberly frequently and 2cm dilated, will observe expectantly for 2hrs and re-examine, will augment if needed     d/w Dr. Guan

## 2020-10-12 NOTE — OB PROVIDER H&P - HISTORY OF PRESENT ILLNESS
Patient is a 30 year old  at 38w who presents to L&D for IOL in the setting of cHTN.      KATALINA: 10/27/2020   LMP: 2020     Pregnancy course:    cHTN     Obhx: none   Pmhx: HTN   Pshx: Lasik eye surgery   Meds: PNV, Nifedipine 10mg daily   Allergies: NKDA   BMI: 25   Ultrasound: Vertex, anterior placenta (10/1)   EFW: 3369       B+  Hgb: NR  Rub: Immune  HIV: NR  RPR: NR  GBS: neg Patient is a 30 year old  at 38w who presents to L&D for IOL in the setting of cHTN. Denies any vaginal bleeding, leakage of fluid, and lower abdominal pain/cramping. +FM Denies headache, dizziness, visual disturbances, SOB, and RUQ pain.     KATALINA: 10/27/2020   LMP: 2020     Pregnancy course:    cHTN     Obhx: none  GynHx: denies history of abnormal pap smears, fibroids, endometriosis, or ovarian cysts; denies history of STD's    Pmhx: HTN   Pshx: Lasik eye surgery   Meds: PNV, Nifedipine 10mg daily   Allergies: NKDA   SocHx: denies use of EtOH, illicit trugs, or tobacco during this pregnancy or prior; denies any hx of anxiety or depression; feels safe at home     BMI: 25   Ultrasound: Vertex, anterior placenta (10/1)   EFW: 3369       B+  Hgb: NR  Rub: Immune  HIV: NR  RPR: NR  GBS: neg

## 2020-10-12 NOTE — OB PROVIDER H&P - NSHPPHYSICALEXAM_GEN_ALL_CORE
Vital Signs Last 24 Hrs  T(C): 36.6 (13 Oct 2020 21:26), Max: 36.6 (13 Oct 2020 20:48)  T(F): 97.9 (13 Oct 2020 21:26), Max: 97.9 (13 Oct 2020 21:26)  HR: 78 (13 Oct 2020 21:26) (78 - 78)  BP: 128/77 (13 Oct 2020 21:26) (128/77 - 128/77)  RR: 18 (13 Oct 2020 21:26) (18 - 18)    Gen: NAD  Cardio: RRR  Lungs: CTAB   Abdomen: gravid, nontender to palpation  Ext: nontender lower extremities bilaterally     FHR: baseline 150, moderate variability, +accels, no decels  [place]: kimberly regularly q2-5m

## 2020-10-13 ENCOUNTER — INPATIENT (INPATIENT)
Facility: HOSPITAL | Age: 30
LOS: 2 days | Discharge: HOME | End: 2020-10-16
Attending: STUDENT IN AN ORGANIZED HEALTH CARE EDUCATION/TRAINING PROGRAM | Admitting: STUDENT IN AN ORGANIZED HEALTH CARE EDUCATION/TRAINING PROGRAM
Payer: COMMERCIAL

## 2020-10-13 VITALS
DIASTOLIC BLOOD PRESSURE: 77 MMHG | RESPIRATION RATE: 18 BRPM | SYSTOLIC BLOOD PRESSURE: 128 MMHG | TEMPERATURE: 98 F | HEART RATE: 78 BPM

## 2020-10-13 DIAGNOSIS — Z98.890 OTHER SPECIFIED POSTPROCEDURAL STATES: Chronic | ICD-10-CM

## 2020-10-13 LAB
ALBUMIN SERPL ELPH-MCNC: 3.5 G/DL — SIGNIFICANT CHANGE UP (ref 3.3–5.2)
ALP SERPL-CCNC: 150 U/L — HIGH (ref 40–120)
ALT FLD-CCNC: 9 U/L — SIGNIFICANT CHANGE UP
ANION GAP SERPL CALC-SCNC: 14 MMOL/L — SIGNIFICANT CHANGE UP (ref 5–17)
APPEARANCE UR: CLEAR — SIGNIFICANT CHANGE UP
AST SERPL-CCNC: 14 U/L — SIGNIFICANT CHANGE UP
BASOPHILS # BLD AUTO: 0.02 K/UL — SIGNIFICANT CHANGE UP (ref 0–0.2)
BASOPHILS NFR BLD AUTO: 0.2 % — SIGNIFICANT CHANGE UP (ref 0–2)
BILIRUB SERPL-MCNC: <0.2 MG/DL — LOW (ref 0.4–2)
BILIRUB UR-MCNC: NEGATIVE — SIGNIFICANT CHANGE UP
BLD GP AB SCN SERPL QL: SIGNIFICANT CHANGE UP
BUN SERPL-MCNC: 12 MG/DL — SIGNIFICANT CHANGE UP (ref 8–20)
CALCIUM SERPL-MCNC: 9.5 MG/DL — SIGNIFICANT CHANGE UP (ref 8.6–10.2)
CHLORIDE SERPL-SCNC: 103 MMOL/L — SIGNIFICANT CHANGE UP (ref 98–107)
CO2 SERPL-SCNC: 23 MMOL/L — SIGNIFICANT CHANGE UP (ref 22–29)
COLOR SPEC: YELLOW — SIGNIFICANT CHANGE UP
CREAT SERPL-MCNC: 0.64 MG/DL — SIGNIFICANT CHANGE UP (ref 0.5–1.3)
DIFF PNL FLD: NEGATIVE — SIGNIFICANT CHANGE UP
EOSINOPHIL # BLD AUTO: 0.08 K/UL — SIGNIFICANT CHANGE UP (ref 0–0.5)
EOSINOPHIL NFR BLD AUTO: 0.8 % — SIGNIFICANT CHANGE UP (ref 0–6)
EPI CELLS # UR: ABNORMAL
GLUCOSE SERPL-MCNC: 100 MG/DL — HIGH (ref 70–99)
GLUCOSE UR QL: 100 MG/DL
HCT VFR BLD CALC: 39.6 % — SIGNIFICANT CHANGE UP (ref 34.5–45)
HGB BLD-MCNC: 13.2 G/DL — SIGNIFICANT CHANGE UP (ref 11.5–15.5)
IMM GRANULOCYTES NFR BLD AUTO: 0.9 % — SIGNIFICANT CHANGE UP (ref 0–1.5)
KETONES UR-MCNC: ABNORMAL
LEUKOCYTE ESTERASE UR-ACNC: ABNORMAL
LYMPHOCYTES # BLD AUTO: 2.06 K/UL — SIGNIFICANT CHANGE UP (ref 1–3.3)
LYMPHOCYTES # BLD AUTO: 21.5 % — SIGNIFICANT CHANGE UP (ref 13–44)
MCHC RBC-ENTMCNC: 32.4 PG — SIGNIFICANT CHANGE UP (ref 27–34)
MCHC RBC-ENTMCNC: 33.3 GM/DL — SIGNIFICANT CHANGE UP (ref 32–36)
MCV RBC AUTO: 97.3 FL — SIGNIFICANT CHANGE UP (ref 80–100)
MONOCYTES # BLD AUTO: 0.78 K/UL — SIGNIFICANT CHANGE UP (ref 0–0.9)
MONOCYTES NFR BLD AUTO: 8.2 % — SIGNIFICANT CHANGE UP (ref 2–14)
NEUTROPHILS # BLD AUTO: 6.53 K/UL — SIGNIFICANT CHANGE UP (ref 1.8–7.4)
NEUTROPHILS NFR BLD AUTO: 68.4 % — SIGNIFICANT CHANGE UP (ref 43–77)
NITRITE UR-MCNC: NEGATIVE — SIGNIFICANT CHANGE UP
PH UR: 6.5 — SIGNIFICANT CHANGE UP (ref 5–8)
PLATELET # BLD AUTO: 269 K/UL — SIGNIFICANT CHANGE UP (ref 150–400)
POTASSIUM SERPL-MCNC: 3.7 MMOL/L — SIGNIFICANT CHANGE UP (ref 3.5–5.3)
POTASSIUM SERPL-SCNC: 3.7 MMOL/L — SIGNIFICANT CHANGE UP (ref 3.5–5.3)
PROT SERPL-MCNC: 6.1 G/DL — LOW (ref 6.6–8.7)
PROT UR-MCNC: 30 MG/DL
RBC # BLD: 4.07 M/UL — SIGNIFICANT CHANGE UP (ref 3.8–5.2)
RBC # FLD: 12.4 % — SIGNIFICANT CHANGE UP (ref 10.3–14.5)
RBC CASTS # UR COMP ASSIST: NEGATIVE /HPF — SIGNIFICANT CHANGE UP (ref 0–4)
SODIUM SERPL-SCNC: 140 MMOL/L — SIGNIFICANT CHANGE UP (ref 135–145)
SP GR SPEC: 1.02 — SIGNIFICANT CHANGE UP (ref 1.01–1.02)
UROBILINOGEN FLD QL: NEGATIVE MG/DL — SIGNIFICANT CHANGE UP
WBC # BLD: 9.56 K/UL — SIGNIFICANT CHANGE UP (ref 3.8–10.5)
WBC # FLD AUTO: 9.56 K/UL — SIGNIFICANT CHANGE UP (ref 3.8–10.5)
WBC UR QL: SIGNIFICANT CHANGE UP

## 2020-10-13 RX ORDER — CITRIC ACID/SODIUM CITRATE 300-500 MG
30 SOLUTION, ORAL ORAL ONCE
Refills: 0 | Status: COMPLETED | OUTPATIENT
Start: 2020-10-13 | End: 2020-10-14

## 2020-10-13 RX ORDER — NIFEDIPINE 30 MG
10 TABLET, EXTENDED RELEASE 24 HR ORAL EVERY 12 HOURS
Refills: 0 | Status: DISCONTINUED | OUTPATIENT
Start: 2020-10-13 | End: 2020-10-15

## 2020-10-13 RX ADMIN — Medication 10 MILLIGRAM(S): at 23:32

## 2020-10-13 RX ADMIN — SODIUM CHLORIDE 125 MILLILITER(S): 9 INJECTION, SOLUTION INTRAVENOUS at 23:33

## 2020-10-13 NOTE — OB PROVIDER LABOR PROGRESS NOTE - NS_SUBJECTIVE/OBJECTIVE_OBGYN_ALL_OB_FT
31 y/o  at 38w0d admitted for IOL 2/2 chronic hypertension.   Patient reports no pain, does not feel the contractions.

## 2020-10-13 NOTE — OB PROVIDER LABOR PROGRESS NOTE - ASSESSMENT
29 y/o  at 38w0d admitted for IOL 2/2 chronic hypertension.   - patient making change with expectant management, will continue   - Will continue to monitor FHT/[place] and reassess for cervical at 3am   - will augment with pitocin if needed     d/w Dr. Guan

## 2020-10-14 ENCOUNTER — RESULT REVIEW (OUTPATIENT)
Age: 30
End: 2020-10-14

## 2020-10-14 ENCOUNTER — APPOINTMENT (OUTPATIENT)
Dept: ANTEPARTUM | Facility: CLINIC | Age: 30
End: 2020-10-14

## 2020-10-14 ENCOUNTER — TRANSCRIPTION ENCOUNTER (OUTPATIENT)
Age: 30
End: 2020-10-14

## 2020-10-14 ENCOUNTER — APPOINTMENT (OUTPATIENT)
Dept: OBGYN | Facility: HOSPITAL | Age: 30
End: 2020-10-14

## 2020-10-14 LAB
CREAT ?TM UR-MCNC: 83 MG/DL — SIGNIFICANT CHANGE UP
PROT ?TM UR-MCNC: 17 MG/DL — HIGH (ref 0–12)
PROT/CREAT UR-RTO: 0.2 RATIO — SIGNIFICANT CHANGE UP
SARS-COV-2 IGG SERPL QL IA: NEGATIVE — SIGNIFICANT CHANGE UP
SARS-COV-2 IGM SERPL IA-ACNC: 0.09 INDEX — SIGNIFICANT CHANGE UP
T PALLIDUM AB TITR SER: NEGATIVE — SIGNIFICANT CHANGE UP

## 2020-10-14 PROCEDURE — 88307 TISSUE EXAM BY PATHOLOGIST: CPT | Mod: 26

## 2020-10-14 PROCEDURE — 59400 OBSTETRICAL CARE: CPT

## 2020-10-14 RX ORDER — NIFEDIPINE 30 MG
10 TABLET, EXTENDED RELEASE 24 HR ORAL EVERY 12 HOURS
Refills: 0 | Status: DISCONTINUED | OUTPATIENT
Start: 2020-10-15 | End: 2020-10-16

## 2020-10-14 RX ORDER — SODIUM CHLORIDE 9 MG/ML
1000 INJECTION, SOLUTION INTRAVENOUS ONCE
Refills: 0 | Status: COMPLETED | OUTPATIENT
Start: 2020-10-14 | End: 2020-10-14

## 2020-10-14 RX ORDER — TETANUS TOXOID, REDUCED DIPHTHERIA TOXOID AND ACELLULAR PERTUSSIS VACCINE, ADSORBED 5; 2.5; 8; 8; 2.5 [IU]/.5ML; [IU]/.5ML; UG/.5ML; UG/.5ML; UG/.5ML
0.5 SUSPENSION INTRAMUSCULAR ONCE
Refills: 0 | Status: DISCONTINUED | OUTPATIENT
Start: 2020-10-15 | End: 2020-10-16

## 2020-10-14 RX ORDER — OXYTOCIN 10 UNIT/ML
2 VIAL (ML) INJECTION
Qty: 30 | Refills: 0 | Status: DISCONTINUED | OUTPATIENT
Start: 2020-10-14 | End: 2020-10-16

## 2020-10-14 RX ORDER — DIBUCAINE 1 %
1 OINTMENT (GRAM) RECTAL EVERY 6 HOURS
Refills: 0 | Status: DISCONTINUED | OUTPATIENT
Start: 2020-10-15 | End: 2020-10-16

## 2020-10-14 RX ORDER — SIMETHICONE 80 MG/1
80 TABLET, CHEWABLE ORAL EVERY 4 HOURS
Refills: 0 | Status: DISCONTINUED | OUTPATIENT
Start: 2020-10-15 | End: 2020-10-16

## 2020-10-14 RX ORDER — LANOLIN
1 OINTMENT (GRAM) TOPICAL EVERY 6 HOURS
Refills: 0 | Status: DISCONTINUED | OUTPATIENT
Start: 2020-10-15 | End: 2020-10-16

## 2020-10-14 RX ORDER — SODIUM CHLORIDE 9 MG/ML
3 INJECTION INTRAMUSCULAR; INTRAVENOUS; SUBCUTANEOUS EVERY 8 HOURS
Refills: 0 | Status: DISCONTINUED | OUTPATIENT
Start: 2020-10-15 | End: 2020-10-16

## 2020-10-14 RX ORDER — OXYTOCIN 10 UNIT/ML
333.33 VIAL (ML) INJECTION
Qty: 20 | Refills: 0 | Status: DISCONTINUED | OUTPATIENT
Start: 2020-10-14 | End: 2020-10-16

## 2020-10-14 RX ORDER — IBUPROFEN 200 MG
600 TABLET ORAL EVERY 6 HOURS
Refills: 0 | Status: COMPLETED | OUTPATIENT
Start: 2020-10-15 | End: 2021-09-13

## 2020-10-14 RX ORDER — OXYCODONE HYDROCHLORIDE 5 MG/1
5 TABLET ORAL ONCE
Refills: 0 | Status: DISCONTINUED | OUTPATIENT
Start: 2020-10-15 | End: 2020-10-16

## 2020-10-14 RX ORDER — PRAMOXINE HYDROCHLORIDE 150 MG/15G
1 AEROSOL, FOAM RECTAL EVERY 4 HOURS
Refills: 0 | Status: DISCONTINUED | OUTPATIENT
Start: 2020-10-15 | End: 2020-10-16

## 2020-10-14 RX ORDER — OXYCODONE HYDROCHLORIDE 5 MG/1
5 TABLET ORAL
Refills: 0 | Status: DISCONTINUED | OUTPATIENT
Start: 2020-10-15 | End: 2020-10-16

## 2020-10-14 RX ORDER — DIPHENHYDRAMINE HCL 50 MG
25 CAPSULE ORAL EVERY 6 HOURS
Refills: 0 | Status: DISCONTINUED | OUTPATIENT
Start: 2020-10-15 | End: 2020-10-16

## 2020-10-14 RX ORDER — AER TRAVELER 0.5 G/1
1 SOLUTION RECTAL; TOPICAL EVERY 4 HOURS
Refills: 0 | Status: DISCONTINUED | OUTPATIENT
Start: 2020-10-15 | End: 2020-10-16

## 2020-10-14 RX ORDER — BENZOCAINE 10 %
1 GEL (GRAM) MUCOUS MEMBRANE EVERY 6 HOURS
Refills: 0 | Status: DISCONTINUED | OUTPATIENT
Start: 2020-10-15 | End: 2020-10-16

## 2020-10-14 RX ORDER — ACETAMINOPHEN 500 MG
975 TABLET ORAL
Refills: 0 | Status: DISCONTINUED | OUTPATIENT
Start: 2020-10-15 | End: 2020-10-16

## 2020-10-14 RX ORDER — HYDROCORTISONE 1 %
1 OINTMENT (GRAM) TOPICAL EVERY 6 HOURS
Refills: 0 | Status: DISCONTINUED | OUTPATIENT
Start: 2020-10-15 | End: 2020-10-16

## 2020-10-14 RX ORDER — MAGNESIUM HYDROXIDE 400 MG/1
30 TABLET, CHEWABLE ORAL
Refills: 0 | Status: DISCONTINUED | OUTPATIENT
Start: 2020-10-15 | End: 2020-10-16

## 2020-10-14 RX ORDER — KETOROLAC TROMETHAMINE 30 MG/ML
30 SYRINGE (ML) INJECTION ONCE
Refills: 0 | Status: DISCONTINUED | OUTPATIENT
Start: 2020-10-14 | End: 2020-10-14

## 2020-10-14 RX ADMIN — Medication 10 MILLIGRAM(S): at 09:08

## 2020-10-14 RX ADMIN — Medication 10 MILLIGRAM(S): at 21:50

## 2020-10-14 RX ADMIN — SODIUM CHLORIDE 1000 MILLILITER(S): 9 INJECTION, SOLUTION INTRAVENOUS at 12:21

## 2020-10-14 RX ADMIN — Medication 30 MILLIGRAM(S): at 22:36

## 2020-10-14 RX ADMIN — Medication 2 MILLIUNIT(S)/MIN: at 04:54

## 2020-10-14 RX ADMIN — Medication 30 MILLIGRAM(S): at 23:12

## 2020-10-14 RX ADMIN — Medication 1000 MILLIUNIT(S)/MIN: at 22:36

## 2020-10-14 RX ADMIN — Medication 30 MILLILITER(S): at 12:21

## 2020-10-14 NOTE — OB PROVIDER LABOR PROGRESS NOTE - NS_SUBJECTIVE/OBJECTIVE_OBGYN_ALL_OB_FT
Patient is resting comfortably in bed. She has 3/10 cramping with her contractions. She declines pain management at this time.

## 2020-10-14 NOTE — OB PROVIDER LABOR PROGRESS NOTE - NS_SUBJECTIVE/OBJECTIVE_OBGYN_ALL_OB_FT
Patient re-evaluated  Comfortable s/p epidural  after cervical exam, isolated deceleration that resolved with repositioning to L lateral decubitus    Vital Signs Last 24 Hrs  T(C): 36.7 (14 Oct 2020 15:35), Max: 37.1 (14 Oct 2020 11:34)  T(F): 98.06 (14 Oct 2020 15:35), Max: 98.78 (14 Oct 2020 11:34)  HR: 71 (14 Oct 2020 16:02) (67 - 97)  BP: 151/76 (14 Oct 2020 16:18) (116/65 - 151/76)  RR: 16 (14 Oct 2020 15:35) (14 - 18)  SpO2: 98% (14 Oct 2020 15:55) (97% - 100%) Patient is resting comfortably in bed with an epidural. No complaints.

## 2020-10-14 NOTE — OB RN DELIVERY SUMMARY - APGAR COMPLETED BY
Called patient to inform her of mirena being in, no answer. Left message for her to call back and schedule insertion.   
Nurse

## 2020-10-14 NOTE — OB PROVIDER LABOR PROGRESS NOTE - NS_SUBJECTIVE/OBJECTIVE_OBGYN_ALL_OB_FT
Patient seen and examined at bedside. She is doing well. She endorses some rectal pressure.   Vital Signs Last 24 Hrs  T(C): 36.6 (13 Oct 2020 21:26), Max: 36.6 (13 Oct 2020 20:48)  T(F): 97.9 (13 Oct 2020 21:26), Max: 97.9 (13 Oct 2020 21:26)  HR: 71 (13 Oct 2020 23:31) (71 - 78)  BP: 133/75 (13 Oct 2020 23:31) (128/77 - 133/75)  RR: 18 (13 Oct 2020 21:26) (18 - 18)

## 2020-10-14 NOTE — OB PROVIDER LABOR PROGRESS NOTE - ASSESSMENT
-patient progressing in labor  -continue pitocin  -epidural effective  -reassess as needed    Discussed with Dr Granados     A/P: 29 y/o  at 38 1/7 wks GA, admitted for induction of labor for cHTN.    -Patient progressing in labor.  -Continue Pitocin.  -Will change patient to [place]'s position.  -Epidural in place and effective.  -Will continue to monitor.    Discussed with Dr. Granados.        ATTENDING UPDATE: Patient is doing well with no complaints and comfortable with her epidural. SVE: 6-/-1. FHT: Category 1, however she had a brief spontaneous deceleration immediately after exam, which recovered after turning to left lateral positioning. Continue Pitocin. Will continue to monitor. Plan of care was discussed with patient. All questions were answered.

## 2020-10-14 NOTE — OB PROVIDER LABOR PROGRESS NOTE - ASSESSMENT
31yo  at 38.1 weeks GA here for an induction of labor in the setting of cHTN.   -VSS  -Cat 1 tracing  -Reba irregularly   -Intact  -Continue with expectant management

## 2020-10-14 NOTE — CHART NOTE - NSCHARTNOTEFT_GEN_A_CORE
Cervix 4cm/60%/-2 station.  Membranes intact.  Pt declines epidural for now.  FHR tracing Category 1.

## 2020-10-14 NOTE — OB PROVIDER LABOR PROGRESS NOTE - NS_OBIHIFHRDETAILS_OBGYN_ALL_OB_FT
Category 1- except for spontaneous 2 minute deceleration down to the 80s after AROM, with recovery to baseline. Moderate variability. +Accel.

## 2020-10-14 NOTE — OB RN DELIVERY SUMMARY - NS_SEPSISRSKCALC_OBGYN_ALL_OB_FT
EOS calculated successfully. EOS Risk Factor: 0.5/1000 live births ([facility] national incidence); GA=38w1d; Temp=98.78; ROM=8.983; GBS='Negative'; Antibiotics='No antibiotics or any antibiotics < 2 hrs prior to birth'   EOS calculated successfully. EOS Risk Factor: 0.5/1000 live births ([facility] national incidence); GA=38w1d; Temp=100.2; ROM=8.983; GBS='Negative'; Antibiotics='No antibiotics or any antibiotics < 2 hrs prior to birth'

## 2020-10-14 NOTE — OB PROVIDER LABOR PROGRESS NOTE - ASSESSMENT
31 y/o  at 38 1/7 wks GA, admitted for induction of labor due to cHTN.    -AROM at bedside- blood tinged.  -SVE has remained unchanged since she was last examined at 04:45 am.  -IUPC placed. CEFM.  -Spontaneous 2 minute deceleration with recovery to baseline after AROM- Pitocin was turned off. Oxygen mask given, left lateral positioning, and 500 cc IVF bolus given. Will re-start Pitocin if FHT remains Cat 1 for the next 30 minutes.  -Patient declines pain management at this time. She states that she will desire an epidural when the pain becomes more intense.  -Will continue to monitor.    Plan of care was discussed with the patient and her partner. All questions were answered.

## 2020-10-14 NOTE — CHART NOTE - NSCHARTNOTEFT_GEN_A_CORE
Patient is sleeping comfortably in bed. She received her epidural.    FHT: Category 1  [place]: Ctx q 2-5 minutes, Pitocin 4 mU    SVE: Deferred    -Continue Pitocin. Will increase Pitocin per protocol until adequate contractions.  -Will continue to monitor.

## 2020-10-14 NOTE — OB PROVIDER LABOR PROGRESS NOTE - NS_OBIHIFHRDETAILS_OBGYN_ALL_OB_FT
baseline 135, moderate variability, no accels, no decels Baseline 135 bpm, moderate variability, no accels, no decels. However, immediately after vaginal exam, isolated spontaneous deceleration that resolved with re-positioning to L lateral decubitus.

## 2020-10-14 NOTE — OB PROVIDER DELIVERY SUMMARY - NSPROVIDERDELIVERYNOTE_OBGYN_ALL_OB_FT
Vaginal Delivery Summary  Procedure: Normal spontaneous vaginal delivery   Findings: Viable female infant delivered in cephalic presentation at , placenta delivered at .  Apgar scores 9/9.   Weight:   Lacerations: 2nd degree perineal, right periurethral   Repair: 2-0 chromic, 3-0 chromic   EBL:_  Complications: None    Procedure:   Patient felt rectal pressure and was found to be fully dilated, +2 station. She pushed effectively. She delivered a viable female infant. A loose nuchal cord X 1 was reduced on delivery of the shoulders and delayed cord clamping was performed for 60 seconds. Placenta delivered intact and pitocin was started at the delivery of the placenta. Perineum and vagina were inspected, 2nd degree laceration and right periurethral laceration present and repaired. Adequate hemostasis was obtained. Fundus was noted to be firm. Vaginal Delivery Summary    Procedure: Normal spontaneous vaginal delivery   Findings: Viable female infant delivered in cephalic presentation at 20:38. APGARs 9/9. Weight: 3320 g.  Lacerations: Right periurethral and second degree perineal laceration with extension to left labia were repaired. Superficial, small bilateral vaginal lacerations- hemostatic and unrepaired.  Repair: 2-0 Chromic, 2-0 Vicryl, and 3-0 Chromic sutures  EBL: 360 mL  Complications: None    Procedure:   Patient felt rectal pressure and was found to be fully dilated and +1 station. Well controlled spontaneous vaginal delivery of a viable female . Infant's head delivered in SUNIL position atraumatically. A loose nuchal cord X 1 was easily reduced. The infant's shoulders and body were guided out of the vagina without difficulty. Delayed cord clamping for 30 seconds was done. Infant was handed off to the patient for skin to skin. Cord gases were sent. Placenta was delivered spontaneously and intact. Excellent uterine tone. The perineum and vagina were inspected. Patient had a right periurethral laceration, which was repaired using 3-0 Chromic sutures. She also had a second degree perineal laceration with extension to left labia were repaired using 2-0 Chromic, 2-0 Vicryl, and 3-0 Chromic sutures. In addition, she had superficial, small bilateral vaginal lacerations, which were hemostatic and unrepaired. No cervical lacerations noted. Rectum was intact. Patient tolerated the delivery and repair well with epidural anesthesia and 1% local lidocaine. No complications.

## 2020-10-14 NOTE — CHART NOTE - NSCHARTNOTEFT_GEN_A_CORE
Patient reports having increased pain and pressure with her contractions.    SVE: FD/100/+1    Will start pushing since she has the urge to push.

## 2020-10-14 NOTE — OB PROVIDER DELIVERY SUMMARY - NSLACERATCATEGORYOTHER_OBGYN_ALL_OB_FT
Right periurethral, second degree perineal laceration with extension to left labial laceration. Superficial, small bilateral vaginal lacerations- hemostatic and unrepaired.

## 2020-10-15 LAB
HCT VFR BLD CALC: 34.6 % — SIGNIFICANT CHANGE UP (ref 34.5–45)
HGB BLD-MCNC: 11.3 G/DL — LOW (ref 11.5–15.5)

## 2020-10-15 RX ORDER — IBUPROFEN 200 MG
600 TABLET ORAL EVERY 6 HOURS
Refills: 0 | Status: DISCONTINUED | OUTPATIENT
Start: 2020-10-15 | End: 2020-10-16

## 2020-10-15 RX ADMIN — Medication 975 MILLIGRAM(S): at 22:05

## 2020-10-15 RX ADMIN — Medication 600 MILLIGRAM(S): at 17:41

## 2020-10-15 RX ADMIN — Medication 975 MILLIGRAM(S): at 04:40

## 2020-10-15 RX ADMIN — SODIUM CHLORIDE 3 MILLILITER(S): 9 INJECTION INTRAMUSCULAR; INTRAVENOUS; SUBCUTANEOUS at 06:11

## 2020-10-15 RX ADMIN — Medication 600 MILLIGRAM(S): at 12:30

## 2020-10-15 RX ADMIN — SODIUM CHLORIDE 3 MILLILITER(S): 9 INJECTION INTRAMUSCULAR; INTRAVENOUS; SUBCUTANEOUS at 21:37

## 2020-10-15 RX ADMIN — Medication 600 MILLIGRAM(S): at 11:47

## 2020-10-15 RX ADMIN — Medication 975 MILLIGRAM(S): at 21:35

## 2020-10-15 RX ADMIN — Medication 975 MILLIGRAM(S): at 03:40

## 2020-10-15 RX ADMIN — Medication 1 TABLET(S): at 11:47

## 2020-10-15 NOTE — DISCHARGE NOTE OB - MATERIALS PROVIDED
MMR Vaccination (VIS Pub Date: 2012)/Breastfeeding Mother’s Support Group Information/Guide to Postpartum Care/Breastfeeding Guide and Packet/Vaccinations/[facility] Hearing Screen Program/[facility] [place] Screening Program/Back To Sleep Handout/Shaken Baby Prevention Handout/[place]  Immunization Record/Breastfeeding Log/Tdap Vaccination (VIS Pub Date: 2012)/Discharge Medication Information for Patients and Families Pocket Guide/Prescription for Breast Pump/Birth Certificate Instructions

## 2020-10-15 NOTE — DISCHARGE NOTE OB - PATIENT PORTAL LINK FT
You can access the FollowMyHealth Patient Portal offered by [facility] by registering at the following website: [email or web address]. By joining [facility]’s FollowMyHealth portal, you will also be able to view your health information using other applications (apps) compatible with our system.

## 2020-10-15 NOTE — PROGRESS NOTE ADULT - SUBJECTIVE AND OBJECTIVE BOX
Name: ALICIA OSCAR  MRN: [id number]  Date Admitted: 10-13-20  Location: [facility] 2014 ([facility] 2EST)  Attending: Patricia Polanco, Nicki Rae      Post Partum: Vaginal Delivery Progress Note    ALICIA OSCAR is a 30y  s/p  PPD# 1 of a viable female infant. cHTN on Procardia 10mg BID.     SUBJECTIVE:  No acute events overnight. Her pain is well controlled with PRN pain medication. No problems with ambulating, voiding, or PO intake. She has had flatus but no BM. Denies N/V. Patient is having normal lochia which is decreasing.    She is breastfeeding and the baby is latching on. Baby present at bedside.     OBJECTIVE:    Vital Signs Last 24 Hrs  T(C): 36.8 (15 Oct 2020 04:47), Max: 37.9 (14 Oct 2020 21:31)  T(F): 98.2 (15 Oct 2020 04:47), Max: 100.2 (14 Oct 2020 21:31)  HR: 66 (15 Oct 2020 04:47) (66 - 106)  BP: 100/67 (15 Oct 2020 04:47) (100/67 - 151/76)  RR: 18 (15 Oct 2020 04:47) (14 - 18)  SpO2: 97% (15 Oct 2020 04:47) (94% - 100%)      Physical exam:  General: AOx3, NAD.  Heart: RRR. S1S2.  Lungs: CTABL. Good airflow bilaterally.   Abdomen: +BS, Soft, appropriately tender to palpitation, firm uterine fundus at umbilicus.  Pelvic: Lochia normal  Ext: No DVT signs, warm extremities.        LABS:                        13.2   9.56  )-----------( 269      ( 13 Oct 2020 21:42 )             39.6

## 2020-10-15 NOTE — DISCHARGE NOTE OB - HOSPITAL COURSE
Patient underwent a normal spontaneous vaginal delivery. Post-partum course was uncomplicated. Pain is well controlled with PRN medication. She has no difficulty with ambulation, voiding, or PO intake. Lab values and vital signs are within normal limits prior to discharge.

## 2020-10-15 NOTE — DISCHARGE NOTE OB - CARE PROVIDER_API CALL
Nicki Granados)  OBSGYN  [facility]  [address] [place] [address]  Phone: [phone]  Fax: [phone]  Follow Up Time:

## 2020-10-15 NOTE — DISCHARGE NOTE OB - CARE PLAN
Principal Discharge DX:	Vaginal delivery  Goal:	Rapid recovery  Assessment and plan of treatment:	1) Please take ibuprofen and/or tylenol as needed for pain as prescribed.  2) Nothing in the vagina for 6 weeks (including no sex, no tampons, and no douching).  3) Please call your doctor for a follow up your postpartum appointment in 6 weeks.  4) Please continue taking vitamins postpartum and procardia for you chronic hypertension.   5) Please call the office sooner if you have heavy vaginal bleeding, severe abdominal pain, or fever > 100.4F. Please call your doctor if you experience headaches, visual disturbances, abdominal pain or new-onset swelling.   6) You may resume regular daily activity as tolerated

## 2020-10-15 NOTE — PROGRESS NOTE ADULT - ASSESSMENT
A/P:  ALICIA OSCAR is a 30y  s/p  PPD# 1 of a viable female infant. cHTN on Procardia 10mg BID.    - VSS. BP's controlled  - Pain is well controlled  - Tolerating PO intake  - Normal bowel function  - Bleeding wnl  - Female infant    - Rh +  - Dispo: Continued post partum care until approved for Dispo home by attending

## 2020-10-15 NOTE — DISCHARGE NOTE OB - PLAN OF CARE
EXAM DESCRIPTION:  CHEST SINGLE VIEW



COMPLETED DATE/TIME:  9/17/2018 5:36 pm



REASON FOR STUDY:  HA, gait instability



COMPARISON:  None.



EXAM PARAMETERS:  NUMBER OF VIEWS: One view.

TECHNIQUE: Single frontal radiographic view of the chest acquired.

RADIATION DOSE: NA

LIMITATIONS: None.



FINDINGS:  LUNGS AND PLEURA: No opacities, masses or pneumothorax. No pleural effusion.

MEDIASTINUM AND HILAR STRUCTURES: No masses.  Contour normal.

HEART AND VASCULAR STRUCTURES: Heart normal in size.  Normal vasculature.

BONES: No acute findings.

HARDWARE: None in the chest.

OTHER: No other significant finding.



IMPRESSION:  NO ACUTE RADIOGRAPHIC FINDING IN THE CHEST.



TECHNICAL DOCUMENTATION:  JOB ID:  [id number]

 2011 [employer]- All Rights Reserved



Reading location - IP/workstation name: MARCIE Rapid recovery 1) Please take ibuprofen and/or tylenol as needed for pain as prescribed.  2) Nothing in the vagina for 6 weeks (including no sex, no tampons, and no douching).  3) Please call your doctor for a follow up your postpartum appointment in 6 weeks.  4) Please continue taking vitamins postpartum and procardia for you chronic hypertension.   5) Please call the office sooner if you have heavy vaginal bleeding, severe abdominal pain, or fever > 100.4F. Please call your doctor if you experience headaches, visual disturbances, abdominal pain or new-onset swelling.   6) You may resume regular daily activity as tolerated

## 2020-10-15 NOTE — PROGRESS NOTE ADULT - ATTENDING COMMENTS
Patient seen and examined, doing well, normal lochia  VS and labs reviewed  NAD  Abdomen soft, fundus firm/NT  PPD#1 s/p  complicated by chronic HTN -stable  HTN -continue procardia, so signes of superimposed PEC  routine Postpartum care  Kimberley Fuentes MD

## 2020-10-16 VITALS
RESPIRATION RATE: 18 BRPM | DIASTOLIC BLOOD PRESSURE: 84 MMHG | TEMPERATURE: 98 F | SYSTOLIC BLOOD PRESSURE: 133 MMHG | HEART RATE: 73 BPM

## 2020-10-16 PROCEDURE — 86769 SARS-COV-2 COVID-19 ANTIBODY: CPT

## 2020-10-16 PROCEDURE — 86900 BLOOD TYPING SEROLOGIC ABO: CPT

## 2020-10-16 PROCEDURE — 59050 FETAL MONITOR W/REPORT: CPT

## 2020-10-16 PROCEDURE — G0463: CPT

## 2020-10-16 PROCEDURE — 84156 ASSAY OF PROTEIN URINE: CPT

## 2020-10-16 PROCEDURE — 59025 FETAL NON-STRESS TEST: CPT

## 2020-10-16 PROCEDURE — 85025 COMPLETE CBC W/AUTO DIFF WBC: CPT

## 2020-10-16 PROCEDURE — 85014 HEMATOCRIT: CPT

## 2020-10-16 PROCEDURE — 86901 BLOOD TYPING SEROLOGIC RH(D): CPT

## 2020-10-16 PROCEDURE — 82570 ASSAY OF URINE CREATININE: CPT

## 2020-10-16 PROCEDURE — 81001 URINALYSIS AUTO W/SCOPE: CPT

## 2020-10-16 PROCEDURE — 86850 RBC ANTIBODY SCREEN: CPT

## 2020-10-16 PROCEDURE — 86780 TREPONEMA PALLIDUM: CPT

## 2020-10-16 PROCEDURE — 85018 HEMOGLOBIN: CPT

## 2020-10-16 PROCEDURE — 88307 TISSUE EXAM BY PATHOLOGIST: CPT

## 2020-10-16 PROCEDURE — 36415 COLL VENOUS BLD VENIPUNCTURE: CPT

## 2020-10-16 PROCEDURE — 80053 COMPREHEN METABOLIC PANEL: CPT

## 2020-10-16 RX ORDER — NIFEDIPINE 30 MG
1 TABLET, EXTENDED RELEASE 24 HR ORAL
Qty: 0 | Refills: 0 | DISCHARGE

## 2020-10-16 RX ORDER — IBUPROFEN 200 MG
1 TABLET ORAL
Qty: 56 | Refills: 0
Start: 2020-10-16 | End: 2020-10-29

## 2020-10-16 RX ORDER — ACETAMINOPHEN 500 MG
2 TABLET ORAL
Qty: 112 | Refills: 0
Start: 2020-10-16 | End: 2020-10-29

## 2020-10-16 RX ADMIN — Medication 600 MILLIGRAM(S): at 05:12

## 2020-10-16 RX ADMIN — Medication 600 MILLIGRAM(S): at 13:00

## 2020-10-16 RX ADMIN — Medication 975 MILLIGRAM(S): at 09:26

## 2020-10-16 RX ADMIN — Medication 1 TABLET(S): at 12:06

## 2020-10-16 RX ADMIN — SODIUM CHLORIDE 3 MILLILITER(S): 9 INJECTION INTRAMUSCULAR; INTRAVENOUS; SUBCUTANEOUS at 05:02

## 2020-10-16 RX ADMIN — Medication 600 MILLIGRAM(S): at 12:06

## 2020-10-16 RX ADMIN — Medication 600 MILLIGRAM(S): at 05:42

## 2020-10-16 RX ADMIN — Medication 975 MILLIGRAM(S): at 08:55

## 2020-10-16 NOTE — PROGRESS NOTE ADULT - ASSESSMENT
A/P:  ALICIA OSCAR is a 30y  s/p  PPD# 2 of a viable female infant. cHTN on Procardia 10mg BID.    - VSS. BP's remain controlled  - Pain is well controlled  - Tolerating PO intake  - Normal bowel function  - Bleeding wnl  - Female infant    - Rh +  - Dispo: Home pending attending briannaoval

## 2020-10-16 NOTE — PROGRESS NOTE ADULT - SUBJECTIVE AND OBJECTIVE BOX
Name: ALICIA OSCAR  MRN: [id number]  Date Admitted: 10-13-20  Location: [facility] 2014 ([facility] 2E)  Attending: Patricia Polanco, Nicki Rae      Post Partum: Vaginal Delivery Progress Note    ALICIA OSCAR is a 30y  s/p  PPD# 2 of a viable female infant. cHTN on Procardia 10mg BID.     SUBJECTIVE:  No acute events overnight. Her pain is well controlled with PRN pain medication. No problems with ambulating, voiding, or PO intake. She has had flatus but and BM. Denies N/V. Patient is having normal lochia which is decreasing.    She is breastfeeding and the baby is latching on. Baby present at bedside.     OBJECTIVE:    Vital Signs Last 24 Hrs  T(C): 36.7 (15 Oct 2020 15:23), Max: 36.7 (15 Oct 2020 12:08)  T(F): 98.1 (15 Oct 2020 15:23), Max: 98.1 (15 Oct 2020 15:23)  HR: 83 (15 Oct 2020 15:23) (77 - 83)  BP: 128/83 (15 Oct 2020 15:23) (112/72 - 128/83)  RR: 18 (15 Oct 2020 15:23) (18 - 18)  SpO2: 100% (15 Oct 2020 15:23) (100% - 100%)      Physical exam:  General: AOx3, NAD.  Heart: RRR. S1S2.  Lungs: CTABL. Good airflow bilaterally.   Abdomen: +BS, Soft, appropriately tender to palpitation, firm uterine fundus at umbilicus.  Pelvic: Lochia normal  Ext: No DVT signs, warm extremities.        LABS:                        11.3   x     )-----------( x        ( 15 Oct 2020 07:29 )             34.6                           13.2   9.56  )-----------( 269      ( 13 Oct 2020 21:42 )             39.6

## 2020-10-17 ENCOUNTER — NON-APPOINTMENT (OUTPATIENT)
Age: 30
End: 2020-10-17

## 2020-10-19 ENCOUNTER — NON-APPOINTMENT (OUTPATIENT)
Age: 30
End: 2020-10-19

## 2020-10-20 LAB — SURGICAL PATHOLOGY STUDY: SIGNIFICANT CHANGE UP

## 2020-10-21 ENCOUNTER — NON-APPOINTMENT (OUTPATIENT)
Age: 30
End: 2020-10-21

## 2020-10-21 ENCOUNTER — APPOINTMENT (OUTPATIENT)
Dept: ANTEPARTUM | Facility: CLINIC | Age: 30
End: 2020-10-21

## 2020-10-22 ENCOUNTER — NON-APPOINTMENT (OUTPATIENT)
Age: 30
End: 2020-10-22

## 2020-10-30 ENCOUNTER — NON-APPOINTMENT (OUTPATIENT)
Age: 30
End: 2020-10-30

## 2020-10-30 ENCOUNTER — APPOINTMENT (OUTPATIENT)
Dept: OBGYN | Facility: CLINIC | Age: 30
End: 2020-10-30
Payer: COMMERCIAL

## 2020-10-30 PROCEDURE — 0503F POSTPARTUM CARE VISIT: CPT

## 2020-10-30 NOTE — DISCUSSION/SUMMARY
[FreeTextEntry1] : Medina scored a 1 on the [place]  Depression Scale.\par \par I advised her that if she has any questions or if she needs any support she can always call the office.\par \par She has a follow up on 20 with Dr. Granados\par \par Pt was appreciative.

## 2020-10-30 NOTE — HISTORY OF PRESENT ILLNESS
[FreeTextEntry1] : Patient's name and  were verified and verbal consent was obtained.\par \par She was informed of her ability to request an in office visit and that an in office visit may be advised at the conclusion of this encounter.\par \par We discussed the care provided via [employer]'s telehealth application will incorporate security protocols to protect PHI. There is also the possibility of connection disruption during the visit.Every effort will be made to re-establish a connection if disconnection occurs, however there is a possibility the session may be terminated. \par \par She was informed telehealth services incur charges similar to an office visit. \par \par She provided verbal consent and wishes to continue with the visit.\par \par \par I advised Medina that  would be using a screening tool to assess for depression. She reports feeling good.\par She is a . She a an  of a baby girl on 10/14/20. She reports a second degree laceration that is healing. She reports light bleeding  and denies any pain. She is feeding the baby with pumped breast milk. She is getting enough rest and reports that she has good support at home. She lives with her .\par  [Home] : at home, [unfilled] , at the time of the visit. [Medical Office: ([facility])___] : at the medical office located in  [Verbal consent obtained from patient] : the patient, [unfilled]

## 2020-11-12 ENCOUNTER — NON-APPOINTMENT (OUTPATIENT)
Age: 30
End: 2020-11-12

## 2020-11-13 ENCOUNTER — NON-APPOINTMENT (OUTPATIENT)
Age: 30
End: 2020-11-13

## 2020-11-23 ENCOUNTER — APPOINTMENT (OUTPATIENT)
Dept: OBGYN | Facility: CLINIC | Age: 30
End: 2020-11-23

## 2020-11-23 VITALS
DIASTOLIC BLOOD PRESSURE: 70 MMHG | BODY MASS INDEX: 21.38 KG/M2 | HEIGHT: 66 IN | TEMPERATURE: 97.5 F | SYSTOLIC BLOOD PRESSURE: 118 MMHG | WEIGHT: 133 LBS

## 2020-11-23 NOTE — HISTORY OF PRESENT ILLNESS
[N] : Patient is not sexually active [Y] : Positive pregnancy history [Mammogramdate] : NA [PapSmeardate] : 3/11/2020 [TextBox_31] : Negative [GonorrheaDate] : 3/11/2020 [TextBox_63] : Negative [ChlamydiaDate] : 3/11/2020 [TextBox_68] : Negative [TrichomonasDate] : 3/11/2020 [TextBox_73] : Negative [HPVDate] : 3/11/2020 [TextBox_78] : Not detected [LMPDate] : 1/23/2020 [PGxTotal] : 1 [[facility] [[facility]] : 1 [TextBox_6] : 1/23/2020 [TextBox_9] : 16 [Menarche Age: ____] : age at menarche was [unfilled] [FreeTextEntry1] : 1/23/2020 [No] : Patient does not have concerns regarding sex [Previously active] : previously active

## 2020-11-25 LAB
HCG UR QL: NEGATIVE
QUALITY CONTROL: YES

## 2021-08-12 ENCOUNTER — APPOINTMENT (OUTPATIENT)
Dept: ENDOCRINOLOGY | Facility: CLINIC | Age: 31
End: 2021-08-12
Payer: COMMERCIAL

## 2021-08-12 VITALS
TEMPERATURE: 97.9 F | HEIGHT: 66 IN | OXYGEN SATURATION: 99 % | HEART RATE: 80 BPM | DIASTOLIC BLOOD PRESSURE: 86 MMHG | WEIGHT: 116 LBS | SYSTOLIC BLOOD PRESSURE: 120 MMHG | BODY MASS INDEX: 18.64 KG/M2

## 2021-08-12 PROCEDURE — 99203 OFFICE O/P NEW LOW 30 MIN: CPT

## 2021-08-16 ENCOUNTER — TRANSCRIPTION ENCOUNTER (OUTPATIENT)
Age: 31
End: 2021-08-16

## 2021-08-16 LAB
CRP SERPL-MCNC: <3 MG/L
ERYTHROCYTE [SEDIMENTATION RATE] IN BLOOD BY WESTERGREN METHOD: 6 MM/HR
T3 SERPL-MCNC: 120 NG/DL
T4 FREE SERPL-MCNC: 1.2 NG/DL
TSH RECEPTOR AB: <1.1 IU/L
TSH SERPL-ACNC: 0.05 UIU/ML
TSI ACT/NOR SER: <0.1 IU/L

## 2021-08-19 ENCOUNTER — TRANSCRIPTION ENCOUNTER (OUTPATIENT)
Age: 31
End: 2021-08-19

## 2021-08-19 LAB — T4 FREE SERPL DIALY-MCNC: 1.2 NG/DL

## 2021-08-27 ENCOUNTER — APPOINTMENT (OUTPATIENT)
Dept: ULTRASOUND IMAGING | Facility: CLINIC | Age: 31
End: 2021-08-27
Payer: COMMERCIAL

## 2021-08-27 ENCOUNTER — OUTPATIENT (OUTPATIENT)
Dept: OUTPATIENT SERVICES | Facility: HOSPITAL | Age: 31
LOS: 1 days | End: 2021-08-27

## 2021-08-27 DIAGNOSIS — Z98.890 OTHER SPECIFIED POSTPROCEDURAL STATES: Chronic | ICD-10-CM

## 2021-08-27 PROCEDURE — 76536 US EXAM OF HEAD AND NECK: CPT | Mod: 26

## 2021-09-28 ENCOUNTER — APPOINTMENT (OUTPATIENT)
Dept: ENDOCRINOLOGY | Facility: CLINIC | Age: 31
End: 2021-09-28

## 2021-10-18 ENCOUNTER — TRANSCRIPTION ENCOUNTER (OUTPATIENT)
Age: 31
End: 2021-10-18

## 2021-10-27 LAB
T3 SERPL-MCNC: 91 NG/DL
T4 FREE SERPL-MCNC: 1.3 NG/DL
T4 SERPL-MCNC: 6.5 UG/DL
TSH SERPL-ACNC: 1.51 UIU/ML

## 2022-03-01 ENCOUNTER — APPOINTMENT (OUTPATIENT)
Dept: ENDOCRINOLOGY | Facility: CLINIC | Age: 32
End: 2022-03-01

## 2022-06-29 NOTE — OB RN PATIENT PROFILE - TEMPERATURE IN CELSIUS (DEGREES C)
36.6 Chonodrocutaneous Helical Advancement Flap Text: The defect edges were debeveled with a #15 scalpel blade.  Given the location of the defect and the proximity to free margins a chondrocutaneous helical advancement flap was deemed most appropriate.  Using a sterile surgical marker, the appropriate advancement flap was drawn incorporating the defect and placing the expected incisions within the relaxed skin tension lines where possible.    The area thus outlined was incised deep to adipose tissue with a #15 scalpel blade.  The skin margins were undermined to an appropriate distance in all directions utilizing iris scissors.

## 2022-12-15 ENCOUNTER — APPOINTMENT (OUTPATIENT)
Dept: OBGYN | Facility: CLINIC | Age: 32
End: 2022-12-15

## 2022-12-15 ENCOUNTER — NON-APPOINTMENT (OUTPATIENT)
Age: 32
End: 2022-12-15

## 2022-12-15 VITALS
DIASTOLIC BLOOD PRESSURE: 70 MMHG | SYSTOLIC BLOOD PRESSURE: 110 MMHG | HEIGHT: 66 IN | BODY MASS INDEX: 19.61 KG/M2 | WEIGHT: 122 LBS

## 2022-12-15 LAB
HCG UR QL: POSITIVE
QUALITY CONTROL: YES

## 2022-12-15 PROCEDURE — 81025 URINE PREGNANCY TEST: CPT

## 2022-12-15 PROCEDURE — 99395 PREV VISIT EST AGE 18-39: CPT

## 2022-12-15 NOTE — REASON FOR VISIT
Pt is a 30year old female admitted to TR1/TR1-A. Patient presents with:  R/o Labor: strong regular contracts since 0200     Pt is [id number] 37w5d intra-uterine pregnancy. History obtained, consents signed. Oriented to room, staff, and plan of care.
[Annual] : an annual visit.

## 2022-12-15 NOTE — COUNSELING
[Vitamins/Supplements] : vitamins/supplements [Breast Self Exam] : breast self exam [Preconception Care/ Fertility options] : preconception care, fertility options [STD (testing, results, tx)] : STD (testing, results, tx)

## 2022-12-16 LAB
C TRACH RRNA SPEC QL NAA+PROBE: NOT DETECTED
HPV HIGH+LOW RISK DNA PNL CVX: NOT DETECTED
N GONORRHOEA RRNA SPEC QL NAA+PROBE: NOT DETECTED
SOURCE TP AMPLIFICATION: NORMAL

## 2022-12-16 NOTE — HISTORY OF PRESENT ILLNESS
[N] : Patient does not use contraception [Y] : Positive pregnancy history [Menarche Age: ____] : age at menarche was [unfilled] [No] : Patient does not have concerns regarding sex [Currently Active] : currently active [PapSmeardate] : 03/11/20 [TextBox_31] : NEG [GonorrheaDate] : 03/11/20 [TextBox_63] : NEG [ChlamydiaDate] : 03/11/20 [TextBox_68] : NEG [HPVDate] : 03/11/20 [TextBox_78] : NEG [LMPDate] : 10/30/22 [PGHxTotal] : 2 [[facility] Center[facility]] : 1 [[facility]] : 1 [FreeTextEntry1] : 10/30/22

## 2022-12-16 NOTE — PHYSICAL EXAM
[Chaperone Present] : A chaperone was present in the examining room during all aspects of the physical examination [Appropriately responsive] : appropriately responsive [Alert] : alert [No Acute Distress] : no acute distress [Soft] : soft [Non-tender] : non-tender [Non-distended] : non-distended [No Mass] : no mass [Oriented x3] : oriented x3 [Examination Of The Breasts] : a normal appearance [No Masses] : no breast masses were palpable [Labia Majora] : normal [Labia Minora] : normal [No Bleeding] : There was no active vaginal bleeding [Normal] : normal [Uterine Adnexae] : non-palpable [FreeTextEntry1] : MARIA LUISA Love [Tenderness] : nontender

## 2022-12-16 NOTE — PLAN
[FreeTextEntry1] : -F/u pap and STI screen\par -Continue PNV\par -RTO 1-2 weeks for new OB apt. \par -Re-oriented to practice, as she had her first child here in 2020\par -We discussed management of pregnancy related nausea and vomiting \par -Call parameters for pain and bleeding reviewed\par -Questions and concerns addressed \par -Dermatology recommended for routine skin survey \par -Call or RTO PRN for any new problems, questions or concerns \par

## 2022-12-29 LAB — CYTOLOGY CVX/VAG DOC THIN PREP: NORMAL

## 2023-01-05 ENCOUNTER — APPOINTMENT (OUTPATIENT)
Dept: ANTEPARTUM | Facility: CLINIC | Age: 33
End: 2023-01-05
Payer: COMMERCIAL

## 2023-01-05 ENCOUNTER — ASOB RESULT (OUTPATIENT)
Age: 33
End: 2023-01-05

## 2023-01-05 ENCOUNTER — APPOINTMENT (OUTPATIENT)
Dept: OBGYN | Facility: CLINIC | Age: 33
End: 2023-01-05
Payer: COMMERCIAL

## 2023-01-05 VITALS
HEIGHT: 66 IN | WEIGHT: 125 LBS | BODY MASS INDEX: 20.09 KG/M2 | DIASTOLIC BLOOD PRESSURE: 60 MMHG | SYSTOLIC BLOOD PRESSURE: 136 MMHG

## 2023-01-05 LAB
BILIRUB UR QL STRIP: NORMAL
GLUCOSE UR-MCNC: NORMAL
HCG UR QL: 0.2 EU/DL
HGB UR QL STRIP.AUTO: NORMAL
KETONES UR-MCNC: NORMAL
LEUKOCYTE ESTERASE UR QL STRIP: ABNORMAL
NITRITE UR QL STRIP: NORMAL
PH UR STRIP: 6.5
PROT UR STRIP-MCNC: NORMAL
SP GR UR STRIP: 1

## 2023-01-05 PROCEDURE — 76817 TRANSVAGINAL US OBSTETRIC: CPT

## 2023-01-05 PROCEDURE — 36415 COLL VENOUS BLD VENIPUNCTURE: CPT

## 2023-01-05 PROCEDURE — 0500F INITIAL PRENATAL CARE VISIT: CPT

## 2023-01-06 ENCOUNTER — NON-APPOINTMENT (OUTPATIENT)
Age: 33
End: 2023-01-06

## 2023-01-06 LAB
ABO + RH PNL BLD: NORMAL
ALBUMIN SERPL ELPH-MCNC: 4.8 G/DL
ALP BLD-CCNC: 41 U/L
ALT SERPL-CCNC: 13 U/L
ANION GAP SERPL CALC-SCNC: 15 MMOL/L
AST SERPL-CCNC: 13 U/L
BASOPHILS # BLD AUTO: 0.03 K/UL
BASOPHILS NFR BLD AUTO: 0.4 %
BILIRUB SERPL-MCNC: 0.2 MG/DL
BLD GP AB SCN SERPL QL: NORMAL
BUN SERPL-MCNC: 10 MG/DL
C TRACH RRNA SPEC QL NAA+PROBE: NOT DETECTED
CALCIUM SERPL-MCNC: 9.7 MG/DL
CHLORIDE SERPL-SCNC: 100 MMOL/L
CO2 SERPL-SCNC: 23 MMOL/L
CREAT SERPL-MCNC: 0.52 MG/DL
CREAT SPEC-SCNC: 11 MG/DL
CREAT/PROT UR: 0.4 RATIO
EGFR: 125 ML/MIN/1.73M2
EOSINOPHIL # BLD AUTO: 0.06 K/UL
EOSINOPHIL NFR BLD AUTO: 0.7 %
ESTIMATED AVERAGE GLUCOSE: 100 MG/DL
GLUCOSE SERPL-MCNC: 65 MG/DL
HBA1C MFR BLD HPLC: 5.1 %
HBV SURFACE AG SER QL: NONREACTIVE
HCT VFR BLD CALC: 41 %
HGB BLD-MCNC: 13.9 G/DL
IMM GRANULOCYTES NFR BLD AUTO: 0.2 %
LYMPHOCYTES # BLD AUTO: 1.87 K/UL
LYMPHOCYTES NFR BLD AUTO: 21.9 %
MAN DIFF?: NORMAL
MCHC RBC-ENTMCNC: 31.9 PG
MCHC RBC-ENTMCNC: 33.9 GM/DL
MCV RBC AUTO: 94 FL
MONOCYTES # BLD AUTO: 0.62 K/UL
MONOCYTES NFR BLD AUTO: 7.3 %
N GONORRHOEA RRNA SPEC QL NAA+PROBE: NOT DETECTED
NEUTROPHILS # BLD AUTO: 5.92 K/UL
NEUTROPHILS NFR BLD AUTO: 69.5 %
PLATELET # BLD AUTO: 297 K/UL
POTASSIUM SERPL-SCNC: 4 MMOL/L
PROT SERPL-MCNC: 7.2 G/DL
PROT UR-MCNC: 4 MG/DL
RBC # BLD: 4.36 M/UL
RBC # FLD: 12.5 %
SODIUM SERPL-SCNC: 138 MMOL/L
SOURCE AMPLIFICATION: NORMAL
TSH SERPL-ACNC: 0.82 UIU/ML
WBC # FLD AUTO: 8.52 K/UL

## 2023-01-09 LAB
BACTERIA UR CULT: NORMAL
HIV1+2 AB SPEC QL IA.RAPID: NONREACTIVE
M TB IFN-G BLD-IMP: NEGATIVE
MEV IGG FLD QL IA: 282 AU/ML
MEV IGG+IGM SER-IMP: POSITIVE
QUANTIFERON TB PLUS MITOGEN MINUS NIL: 7.43 IU/ML
QUANTIFERON TB PLUS NIL: 0.02 IU/ML
QUANTIFERON TB PLUS TB1 MINUS NIL: -0.01 IU/ML
QUANTIFERON TB PLUS TB2 MINUS NIL: 0 IU/ML
RUBV IGG FLD-ACNC: 1.2 INDEX
RUBV IGG SER-IMP: POSITIVE
T PALLIDUM AB SER QL IA: NEGATIVE
VZV AB TITR SER: POSITIVE
VZV IGG SER IF-ACNC: 1279 INDEX

## 2023-02-01 ENCOUNTER — ASOB RESULT (OUTPATIENT)
Age: 33
End: 2023-02-01

## 2023-02-01 ENCOUNTER — APPOINTMENT (OUTPATIENT)
Dept: OBGYN | Facility: CLINIC | Age: 33
End: 2023-02-01
Payer: COMMERCIAL

## 2023-02-01 ENCOUNTER — APPOINTMENT (OUTPATIENT)
Dept: ANTEPARTUM | Facility: CLINIC | Age: 33
End: 2023-02-01
Payer: COMMERCIAL

## 2023-02-01 LAB
BILIRUB UR QL STRIP: NORMAL
GLUCOSE BLDC GLUCOMTR-MCNC: 106
GLUCOSE UR-MCNC: 100
HCG UR QL: 0.2 EU/DL
HGB UR QL STRIP.AUTO: ABNORMAL
KETONES UR-MCNC: NORMAL
LEUKOCYTE ESTERASE UR QL STRIP: ABNORMAL
NITRITE UR QL STRIP: NORMAL
PH UR STRIP: 6
PROT UR STRIP-MCNC: NORMAL
SP GR UR STRIP: 1

## 2023-02-01 PROCEDURE — 76813 OB US NUCHAL MEAS 1 GEST: CPT

## 2023-02-01 PROCEDURE — 0502F SUBSEQUENT PRENATAL CARE: CPT

## 2023-02-01 PROCEDURE — 82962 GLUCOSE BLOOD TEST: CPT

## 2023-02-22 LAB
ADDITIONAL US: NORMAL
COMMENTS: AFP: NORMAL
CRL SCAN TWIN B: NORMAL
CRL SCAN: NORMAL
CROWN RUMP LENGTH TWIN B: NORMAL
CROWN RUMP LENGTH: 74.1 MM
DOWN SYNDROME AGE RISK: NORMAL
DOWN SYNDROME INTERPRETATION: NORMAL
DOWN SYNDROME SCREENING RISK: NORMAL
GEST. AGE ON COLLECTION DATE: 13.3 WEEKS
HCG MOM: 0.88
HCG VALUE: 86.5 IU/ML
MATERNAL AGE AT EDD: 35.3 YR
NOTE: AFP: NORMAL
NT MOM TWIN B: NORMAL
NT TWIN B: NORMAL
NUCHAL TRANSLUCENCY (NT): 1.4 MM
NUCHAL TRANSLUCENCY MOM: 1
NUMBER OF FETUSES: 1
PAPP-A MOM: 0.66
PAPP-A VALUE: 995.2 NG/ML
RACE: NORMAL
RESULTS AFP: NORMAL
SONOGRAPHER ID#: NORMAL
SUBMIT PART 2 SAMPLE USING: NORMAL
TEST RESULTS: AFP: NORMAL
TRISOMY 18 AGE RISK: NORMAL
TRISOMY 18 INTERPRETATION: NORMAL
TRISOMY 18 SCREENING RISK: NORMAL
WEIGHT AFP: 130 LBS

## 2023-03-01 ENCOUNTER — NON-APPOINTMENT (OUTPATIENT)
Age: 33
End: 2023-03-01

## 2023-03-01 ENCOUNTER — APPOINTMENT (OUTPATIENT)
Dept: OBGYN | Facility: CLINIC | Age: 33
End: 2023-03-01
Payer: COMMERCIAL

## 2023-03-01 VITALS
BODY MASS INDEX: 21.69 KG/M2 | DIASTOLIC BLOOD PRESSURE: 70 MMHG | WEIGHT: 135 LBS | SYSTOLIC BLOOD PRESSURE: 122 MMHG | HEIGHT: 66 IN

## 2023-03-01 PROCEDURE — 0502F SUBSEQUENT PRENATAL CARE: CPT

## 2023-03-01 PROCEDURE — 36415 COLL VENOUS BLD VENIPUNCTURE: CPT

## 2023-03-07 LAB
ADDITIONAL US: NORMAL
AFP MOM: 0.85
AFP VALUE: 35.7 NG/ML
COLLECTED ON 2: NORMAL
COLLECTED ON: NORMAL
CRL SCAN TWIN B: NORMAL
CRL SCAN: NORMAL
CROWN RUMP LENGTH TWIN B: NORMAL
CROWN RUMP LENGTH: 74.1 MM
DIA MOM: 0.91
DIA VALUE: 151.6 PG/ML
DOWN SYNDROME AGE RISK: NORMAL
DOWN SYNDROME INTERPRETATION: NORMAL
DOWN SYNDROME SCREENING RISK: NORMAL
FIRST TRIMESTER SAMPLE: NORMAL
GEST. AGE ON COLLECTION DATE: 13.3 WEEKS
GESTATIONAL AGE: 17.3 WEEKS
HCG MOM: 1.26
HCG VALUE: 40.3 IU/ML
INSULIN DEP DIABETES: NO
MATERNAL AGE AT EDD: 35.3 YR
NT MOM TWIN B: NORMAL
NT TWIN B: NORMAL
NUCHAL TRANSLUCENCY (NT): 1.4 MM
NUCHAL TRANSLUCENCY MOM: 1
NUMBER OF FETUSES: 1
OPEN SPINA BIFIDA: NORMAL
OSB INTERPRETATION: NORMAL
PAPP-A MOM: 0.66
PAPP-A VALUE: 995.2 NG/ML
RACE: NORMAL
SECOND TRIMESTER SAMPLE: NORMAL
SEQUENTIAL 2 COMMENTS: NORMAL
SEQUENTIAL 2 NOTE: NORMAL
SEQUENTIAL 2 RESULTS: NORMAL
SEQUENTIAL 2 TEST RESULTS: NORMAL
SONOGRAPHER ID#: NORMAL
TRISOMY 18 AGE RISK: NORMAL
TRISOMY 18 INTERPRETATION: NORMAL
TRISOMY 18 SCREENING RISK: NORMAL
UE3 MOM: 0.71
UE3 VALUE: 0.91 NG/ML
WEIGHT AFP: 130 LBS
WEIGHT: 135 LBS

## 2023-03-22 ENCOUNTER — APPOINTMENT (OUTPATIENT)
Dept: ANTEPARTUM | Facility: CLINIC | Age: 33
End: 2023-03-22
Payer: COMMERCIAL

## 2023-03-22 ENCOUNTER — APPOINTMENT (OUTPATIENT)
Dept: MATERNAL FETAL MEDICINE | Facility: CLINIC | Age: 33
End: 2023-03-22
Payer: COMMERCIAL

## 2023-03-22 ENCOUNTER — NON-APPOINTMENT (OUTPATIENT)
Age: 33
End: 2023-03-22

## 2023-03-22 ENCOUNTER — ASOB RESULT (OUTPATIENT)
Age: 33
End: 2023-03-22

## 2023-03-22 VITALS
SYSTOLIC BLOOD PRESSURE: 124 MMHG | OXYGEN SATURATION: 98 % | HEIGHT: 66 IN | DIASTOLIC BLOOD PRESSURE: 80 MMHG | WEIGHT: 139 LBS | BODY MASS INDEX: 22.34 KG/M2 | HEART RATE: 78 BPM | RESPIRATION RATE: 16 BRPM

## 2023-03-22 DIAGNOSIS — Z86.39 PERSONAL HISTORY OF OTHER ENDOCRINE, NUTRITIONAL AND METABOLIC DISEASE: ICD-10-CM

## 2023-03-22 PROCEDURE — 76811 OB US DETAILED SNGL FETUS: CPT

## 2023-03-22 PROCEDURE — 99214 OFFICE O/P EST MOD 30 MIN: CPT

## 2023-03-22 PROCEDURE — 76817 TRANSVAGINAL US OBSTETRIC: CPT

## 2023-03-22 NOTE — VITALS
[LMP (date): ___] : LMP was on [unfilled] [GA =___ Weeks] : which calculates to a GA of [unfilled] weeks [GA= ___ Days] : and [unfilled] day(s) [KATALINA by LMP (date): ___] : The calculated KATALINA by LMP is [unfilled] [By LMP] : this is the final KATLAINA

## 2023-03-22 NOTE — OB HISTORY
[Pregnancy History] : patient received anesthesia [___] : no pregnancy complications reported [LMP: ___] : LMP: [unfilled] [KATALINA: ___] : KATALINA: [unfilled] [EGA: ___ wks] : EGA: [unfilled] wks [Spontaneous] : Spontaneous conception [Sonogram] : sonogram [at ___ wks] : at [unfilled] weeks [FreeTextEntry1] : 32 yr old  here for MFMFC due to CHTN. Reports was diagnosed with CHTN approx 10 yrs ago and is currently on Nifedepine 30mg XL. Baseline PEC bloodwork attached from 23, UPC elevated at 0.4.Reports has a cardiologist last consult was 3 mths ago and she has a F/U in may. Reports checks her BP twice daily at home and her ranges are 115-130/70,s-80,s. Denies headaches or visual changes. Pt has history of hyperthyroidism 2/2 covid in  and has since resolved.Denies cramping or bleeding. Reports starting to feel movement. Level 2 today [Definite:  ___ (Date)] : the last menstrual period was [unfilled] [Normal Amount/Duration] : was of a normal amount and duration [Spotting Between  Menses] : no spotting between menses [Regular Cycle Intervals] : periods have been regular [Frequency: Q ___ days] : menstrual periods occur approximately every [unfilled] days [Menarche Age: ____] : age at menarche was [unfilled] [Menstrual Cramps] : menstrual cramps [On BCP at conception] : the patient was not on BCP at conception

## 2023-03-22 NOTE — DISCUSSION/SUMMARY
[FreeTextEntry1] : We had the pleasure of seeing your patient for a Maternal-Fetal Medicine consultation today. She is a 32 year-old  at 20 3/7 weeks of gestation with a pregnancy complicated by the below issues.\par \par She was extensively counseled regarding the following issues:\par \par Chronic hypertension: Women with pre-existing hypertension are at an increased risk for exacerbation of hypertension and super-imposed preeclampsia. It is recommended that women with chronic hypertension have baseline laboratory workup that includes: 24 hour urine protein collection or protein/creatinine ratio to establish a baseline of proteinuria, comprehensive metabolic profile (CMP) to evaluate renal and liver function, and complete blood count (CBC) to check platelets. The fetus is at an increased risk for growth restriction. Therefore, ultrasounds for growth are recommended every 4 weeks. Antepartum surveillance is recommended starting at 32 weeks. Home blood pressure monitoring is recommended. We discussed that the goal of antihypertensive treatment in pregnancy is to keep blood pressures less than 140/90. Currently her blood pressures are consistently below this with nifedipine ER 30 mg daily. Low dose aspirin recommended for preeclampsia risk reduction.\par \par History of transient hyperthyroidism after COVID-19 vaccine: Currently clinically euthyroid requiring no treatment. No further management necessary.\par \par Follow-up growth ultrasound in 4 weeks\par Follow-up MFM consult in third trimester to discuss delivery timing\par \par \par Thank you for requesting a consultation on this patient. The total time spent in preparation for this visit, medical history taking, orders, review of records, counseling the patient, and writing this note was 45 minutes.\par \par At the end of our discussion, the patient indicated that her questions were answered and she seemed satisfied with our discussion. Please do not hesitate to contact us with any questions.\par \par Sincerely,\par \par \par Jose L Tesfaye MD, URSULA\par Attending Physician, Maternal-Fetal Medicine\par \par

## 2023-03-30 ENCOUNTER — NON-APPOINTMENT (OUTPATIENT)
Age: 33
End: 2023-03-30

## 2023-03-31 ENCOUNTER — APPOINTMENT (OUTPATIENT)
Dept: OBGYN | Facility: CLINIC | Age: 33
End: 2023-03-31
Payer: COMMERCIAL

## 2023-03-31 ENCOUNTER — RESULT CHARGE (OUTPATIENT)
Age: 33
End: 2023-03-31

## 2023-03-31 VITALS
DIASTOLIC BLOOD PRESSURE: 76 MMHG | SYSTOLIC BLOOD PRESSURE: 118 MMHG | WEIGHT: 141 LBS | HEIGHT: 66 IN | BODY MASS INDEX: 22.66 KG/M2

## 2023-03-31 PROCEDURE — 82962 GLUCOSE BLOOD TEST: CPT

## 2023-03-31 PROCEDURE — 0502F SUBSEQUENT PRENATAL CARE: CPT

## 2023-04-04 LAB
BILIRUB UR QL STRIP: NORMAL
CLARITY UR: CLEAR
COLLECTION METHOD: NORMAL
GLUCOSE BLDC GLUCOMTR-MCNC: 103
GLUCOSE UR-MCNC: 100
HCG UR QL: 0.2 EU/DL
HGB UR QL STRIP.AUTO: NORMAL
KETONES UR-MCNC: NORMAL
LEUKOCYTE ESTERASE UR QL STRIP: NORMAL
NITRITE UR QL STRIP: NORMAL
PH UR STRIP: 7
PROT UR STRIP-MCNC: NORMAL
SP GR UR STRIP: 1.01

## 2023-04-19 ENCOUNTER — ASOB RESULT (OUTPATIENT)
Age: 33
End: 2023-04-19

## 2023-04-19 ENCOUNTER — APPOINTMENT (OUTPATIENT)
Dept: ANTEPARTUM | Facility: CLINIC | Age: 33
End: 2023-04-19
Payer: COMMERCIAL

## 2023-04-19 PROCEDURE — 76816 OB US FOLLOW-UP PER FETUS: CPT

## 2023-04-28 ENCOUNTER — NON-APPOINTMENT (OUTPATIENT)
Age: 33
End: 2023-04-28

## 2023-04-28 ENCOUNTER — APPOINTMENT (OUTPATIENT)
Dept: OBGYN | Facility: CLINIC | Age: 33
End: 2023-04-28
Payer: COMMERCIAL

## 2023-04-28 VITALS
HEIGHT: 66 IN | SYSTOLIC BLOOD PRESSURE: 116 MMHG | BODY MASS INDEX: 23.95 KG/M2 | DIASTOLIC BLOOD PRESSURE: 72 MMHG | WEIGHT: 149 LBS

## 2023-04-28 PROCEDURE — 36415 COLL VENOUS BLD VENIPUNCTURE: CPT

## 2023-04-28 PROCEDURE — 0502F SUBSEQUENT PRENATAL CARE: CPT

## 2023-04-30 ENCOUNTER — TRANSCRIPTION ENCOUNTER (OUTPATIENT)
Age: 33
End: 2023-04-30

## 2023-04-30 LAB
BASOPHILS # BLD AUTO: 0.03 K/UL
BASOPHILS NFR BLD AUTO: 0.3 %
BILIRUB UR QL STRIP: NORMAL
CLARITY UR: CLEAR
COLLECTION METHOD: NORMAL
EOSINOPHIL # BLD AUTO: 0.08 K/UL
EOSINOPHIL NFR BLD AUTO: 0.9 %
GLUCOSE 1H P 50 G GLC PO SERPL-MCNC: 126 MG/DL
GLUCOSE UR-MCNC: NORMAL
HCG UR QL: 0.2 EU/DL
HCT VFR BLD CALC: 40 %
HGB BLD-MCNC: 12.9 G/DL
HGB UR QL STRIP.AUTO: NORMAL
IMM GRANULOCYTES NFR BLD AUTO: 0.6 %
KETONES UR-MCNC: ABNORMAL
LEUKOCYTE ESTERASE UR QL STRIP: ABNORMAL
LYMPHOCYTES # BLD AUTO: 1.82 K/UL
LYMPHOCYTES NFR BLD AUTO: 20.8 %
MAN DIFF?: NORMAL
MCHC RBC-ENTMCNC: 31.9 PG
MCHC RBC-ENTMCNC: 32.3 GM/DL
MCV RBC AUTO: 98.8 FL
MONOCYTES # BLD AUTO: 0.59 K/UL
MONOCYTES NFR BLD AUTO: 6.8 %
NEUTROPHILS # BLD AUTO: 6.16 K/UL
NEUTROPHILS NFR BLD AUTO: 70.6 %
NITRITE UR QL STRIP: NORMAL
PH UR STRIP: 6.5
PLATELET # BLD AUTO: 276 K/UL
PROT UR STRIP-MCNC: NORMAL
RBC # BLD: 4.05 M/UL
RBC # FLD: 12.4 %
SP GR UR STRIP: 1.01
WBC # FLD AUTO: 8.73 K/UL

## 2023-05-17 ENCOUNTER — APPOINTMENT (OUTPATIENT)
Dept: ANTEPARTUM | Facility: CLINIC | Age: 33
End: 2023-05-17
Payer: COMMERCIAL

## 2023-05-17 ENCOUNTER — ASOB RESULT (OUTPATIENT)
Age: 33
End: 2023-05-17

## 2023-05-17 PROCEDURE — 76820 UMBILICAL ARTERY ECHO: CPT | Mod: 59

## 2023-05-17 PROCEDURE — 76816 OB US FOLLOW-UP PER FETUS: CPT

## 2023-05-22 ENCOUNTER — APPOINTMENT (OUTPATIENT)
Dept: OBGYN | Facility: CLINIC | Age: 33
End: 2023-05-22
Payer: COMMERCIAL

## 2023-05-22 VITALS
DIASTOLIC BLOOD PRESSURE: 80 MMHG | WEIGHT: 154 LBS | HEIGHT: 66 IN | BODY MASS INDEX: 24.75 KG/M2 | SYSTOLIC BLOOD PRESSURE: 118 MMHG

## 2023-05-22 LAB
BILIRUB UR QL STRIP: NORMAL
GLUCOSE UR-MCNC: NORMAL
HCG UR QL: 0.2 EU/DL
HGB UR QL STRIP.AUTO: NORMAL
KETONES UR-MCNC: 15
LEUKOCYTE ESTERASE UR QL STRIP: ABNORMAL
NITRITE UR QL STRIP: NORMAL
PH UR STRIP: 7
PROT UR STRIP-MCNC: NORMAL
SP GR UR STRIP: 1.02

## 2023-05-22 PROCEDURE — 0502F SUBSEQUENT PRENATAL CARE: CPT

## 2023-06-13 ENCOUNTER — APPOINTMENT (OUTPATIENT)
Dept: OBGYN | Facility: CLINIC | Age: 33
End: 2023-06-13
Payer: COMMERCIAL

## 2023-06-13 VITALS
HEIGHT: 66 IN | DIASTOLIC BLOOD PRESSURE: 78 MMHG | BODY MASS INDEX: 25.88 KG/M2 | WEIGHT: 161 LBS | SYSTOLIC BLOOD PRESSURE: 120 MMHG

## 2023-06-13 DIAGNOSIS — Z23 ENCOUNTER FOR IMMUNIZATION: ICD-10-CM

## 2023-06-13 LAB
BILIRUB UR QL STRIP: NORMAL
GLUCOSE UR-MCNC: NORMAL
HCG UR QL: 0.2 EU/DL
HGB UR QL STRIP.AUTO: NORMAL
KETONES UR-MCNC: NORMAL
LEUKOCYTE ESTERASE UR QL STRIP: ABNORMAL
NITRITE UR QL STRIP: NORMAL
PH UR STRIP: 6
PROT UR STRIP-MCNC: NORMAL
SP GR UR STRIP: 1.01

## 2023-06-13 PROCEDURE — 0502F SUBSEQUENT PRENATAL CARE: CPT

## 2023-06-13 PROCEDURE — 90471 IMMUNIZATION ADMIN: CPT

## 2023-06-13 PROCEDURE — 90715 TDAP VACCINE 7 YRS/> IM: CPT

## 2023-06-14 ENCOUNTER — APPOINTMENT (OUTPATIENT)
Dept: ANTEPARTUM | Facility: CLINIC | Age: 33
End: 2023-06-14

## 2023-06-15 ENCOUNTER — APPOINTMENT (OUTPATIENT)
Dept: ANTEPARTUM | Facility: CLINIC | Age: 33
End: 2023-06-15
Payer: COMMERCIAL

## 2023-06-15 ENCOUNTER — ASOB RESULT (OUTPATIENT)
Age: 33
End: 2023-06-15

## 2023-06-15 PROCEDURE — 76818 FETAL BIOPHYS PROFILE W/NST: CPT | Mod: 59

## 2023-06-15 PROCEDURE — 76816 OB US FOLLOW-UP PER FETUS: CPT

## 2023-06-15 PROCEDURE — ZZZZZ: CPT

## 2023-06-15 PROCEDURE — 76820 UMBILICAL ARTERY ECHO: CPT | Mod: 59

## 2023-06-15 PROCEDURE — 36416 COLLJ CAPILLARY BLOOD SPEC: CPT

## 2023-06-16 LAB
ALBUMIN SERPL ELPH-MCNC: 3.7 G/DL
ALP BLD-CCNC: 63 U/L
ALT SERPL-CCNC: 9 U/L
ANION GAP SERPL CALC-SCNC: 18 MMOL/L
APTT 2H P INC PPP: NORMAL SEC
APTT IMM NP/PRE NP PPP: NORMAL SEC
APTT INV RATIO PPP: 27.6 SEC
AST SERPL-CCNC: 15 U/L
BILIRUB SERPL-MCNC: 0.2 MG/DL
BUN SERPL-MCNC: 10 MG/DL
CALCIUM SERPL-MCNC: 9.4 MG/DL
CHLORIDE SERPL-SCNC: 102 MMOL/L
CO2 SERPL-SCNC: 22 MMOL/L
CREAT SERPL-MCNC: 0.48 MG/DL
CREAT SPEC-SCNC: 26 MG/DL
CREAT/PROT UR: 0.2 RATIO
EGFR: 127 ML/MIN/1.73M2
FIBRINOGEN PPP-MCNC: 460 MG/DL
GLUCOSE SERPL-MCNC: 72 MG/DL
LDH SERPL-CCNC: 191 U/L
NPP NORMAL POOLED PLASMA: NORMAL SECS
POTASSIUM SERPL-SCNC: 3.9 MMOL/L
PROT SERPL-MCNC: 5.9 G/DL
PROT UR-MCNC: 6 MG/DL
SODIUM SERPL-SCNC: 141 MMOL/L
URATE SERPL-MCNC: 1.9 MG/DL

## 2023-06-22 ENCOUNTER — ASOB RESULT (OUTPATIENT)
Age: 33
End: 2023-06-22

## 2023-06-22 ENCOUNTER — APPOINTMENT (OUTPATIENT)
Dept: ANTEPARTUM | Facility: CLINIC | Age: 33
End: 2023-06-22
Payer: COMMERCIAL

## 2023-06-22 PROCEDURE — 76820 UMBILICAL ARTERY ECHO: CPT

## 2023-06-22 PROCEDURE — 76818 FETAL BIOPHYS PROFILE W/NST: CPT

## 2023-06-26 DIAGNOSIS — Z32.01 ENCOUNTER FOR PREGNANCY TEST, RESULT POSITIVE: ICD-10-CM

## 2023-06-26 DIAGNOSIS — Z86.79 PERSONAL HISTORY OF OTHER DISEASES OF THE CIRCULATORY SYSTEM: ICD-10-CM

## 2023-06-26 DIAGNOSIS — Z36.9 ENCOUNTER FOR ANTENATAL SCREENING, UNSPECIFIED: ICD-10-CM

## 2023-06-26 DIAGNOSIS — Z11.3 ENCOUNTER FOR SCREENING FOR INFECTIONS WITH A PREDOMINANTLY SEXUAL MODE OF TRANSMISSION: ICD-10-CM

## 2023-06-26 DIAGNOSIS — Z01.419 ENCOUNTER FOR GYNECOLOGICAL EXAMINATION (GENERAL) (ROUTINE) W/OUT ABNORMAL FINDINGS: ICD-10-CM

## 2023-06-26 DIAGNOSIS — Z13.32 ENCOUNTER FOR SCREENING FOR MATERNAL DEPRESSION: ICD-10-CM

## 2023-06-26 DIAGNOSIS — Z34.92 ENCOUNTER FOR SUPERVISION OF NORMAL PREGNANCY, UNSPECIFIED, SECOND TRIMESTER: ICD-10-CM

## 2023-06-26 DIAGNOSIS — Z12.4 ENCOUNTER FOR SCREENING FOR MALIGNANT NEOPLASM OF CERVIX: ICD-10-CM

## 2023-06-26 DIAGNOSIS — Z3A.20 20 WEEKS GESTATION OF PREGNANCY: ICD-10-CM

## 2023-06-27 ENCOUNTER — ASOB RESULT (OUTPATIENT)
Age: 33
End: 2023-06-27

## 2023-06-27 ENCOUNTER — APPOINTMENT (OUTPATIENT)
Dept: ANTEPARTUM | Facility: CLINIC | Age: 33
End: 2023-06-27
Payer: COMMERCIAL

## 2023-06-27 ENCOUNTER — APPOINTMENT (OUTPATIENT)
Dept: OBGYN | Facility: CLINIC | Age: 33
End: 2023-06-27
Payer: COMMERCIAL

## 2023-06-27 LAB
BILIRUB UR QL STRIP: NORMAL
GLUCOSE UR-MCNC: ABNORMAL
HCG UR QL: 0.2 EU/DL
HGB UR QL STRIP.AUTO: NORMAL
KETONES UR-MCNC: ABNORMAL
LEUKOCYTE ESTERASE UR QL STRIP: NORMAL
NITRITE UR QL STRIP: NORMAL
PH UR STRIP: 7
PROT UR STRIP-MCNC: ABNORMAL
SP GR UR STRIP: 1.02

## 2023-06-27 PROCEDURE — 76818 FETAL BIOPHYS PROFILE W/NST: CPT

## 2023-06-27 PROCEDURE — 0502F SUBSEQUENT PRENATAL CARE: CPT

## 2023-06-27 PROCEDURE — 59025 FETAL NON-STRESS TEST: CPT

## 2023-06-29 ENCOUNTER — APPOINTMENT (OUTPATIENT)
Dept: ANTEPARTUM | Facility: CLINIC | Age: 33
End: 2023-06-29

## 2023-06-29 ENCOUNTER — APPOINTMENT (OUTPATIENT)
Dept: MATERNAL FETAL MEDICINE | Facility: CLINIC | Age: 33
End: 2023-06-29
Payer: COMMERCIAL

## 2023-06-29 VITALS
HEART RATE: 80 BPM | BODY MASS INDEX: 27 KG/M2 | OXYGEN SATURATION: 98 % | SYSTOLIC BLOOD PRESSURE: 146 MMHG | WEIGHT: 168 LBS | HEIGHT: 66 IN | RESPIRATION RATE: 18 BRPM | DIASTOLIC BLOOD PRESSURE: 80 MMHG

## 2023-06-29 VITALS — DIASTOLIC BLOOD PRESSURE: 82 MMHG | SYSTOLIC BLOOD PRESSURE: 140 MMHG

## 2023-06-29 PROCEDURE — 99214 OFFICE O/P EST MOD 30 MIN: CPT

## 2023-06-29 NOTE — VITALS
[LMP (date): ___] : LMP was on [unfilled] [GA =___ Weeks] : which calculates to a GA of [unfilled] weeks [GA= ___ Days] : and [unfilled] day(s) [KATALINA by LMP (date): ___] : The calculated KATALINA by LMP is [unfilled] [By LMP] : this is the final KATALINA

## 2023-06-30 LAB
CREAT SPEC-SCNC: 23 MG/DL
CREAT/PROT UR: 0.4 RATIO
PROT UR-MCNC: 9 MG/DL

## 2023-06-30 NOTE — HISTORY OF PRESENT ILLNESS
[FreeTextEntry1] : Medina Mosquera is a 33 y.o.  with LMP of 10/30/22 and EDC of 23 who presents at 34w4d for follow up  consultation secondary to pregnancy complicated by chronic hypertension.  Her BMI is 27 kg/m2.  Medina is treated with Procardia XL 30 mg daily and reports normal home BP monitoring. She has no symptoms concerning for preeclampsia.  On the day of consultation, she feels well and reports no constitutional or obstetrical complaint.  Medina confirms fetal movement.

## 2023-06-30 NOTE — OB HISTORY
[Pregnancy History] : patient received anesthesia [LMP: ___] : LMP: [unfilled] [KATALINA: ___] : KATALINA: [unfilled] [Spontaneous] : Spontaneous conception [Sonogram] : sonogram [at ___ wks] : at [unfilled] weeks [Definite:  ___ (Date)] : the last menstrual period was [unfilled] [Normal Amount/Duration] : was of a normal amount and duration [Regular Cycle Intervals] : periods have been regular [Frequency: Q ___ days] : menstrual periods occur approximately every [unfilled] days [Menarche Age: ____] : age at menarche was [unfilled] [Menstrual Cramps] : menstrual cramps [EGA: ___ wks] : EGA: [unfilled] wks [___] : no pregnancy complications reported [FreeTextEntry1] : CHTN diagnosed approximately 10 yrs ago \par Previously on Norvasc and transitioned to Nifedepine 30mg XL prior to first pregnancy\par Performs home BP monitoring\par Initial MFM consult 3/22/23 [Spotting Between  Menses] : no spotting between menses [On BCP at conception] : the patient was not on BCP at conception

## 2023-06-30 NOTE — DISCUSSION/SUMMARY
[FreeTextEntry1] : At that time of follow up consultation, we reviewed that her history of hypertension puts her at increased risk for both maternal and fetal complications in her current pregnancy.  Even with optimal BP control and compliance with her antihypertensive therapy, she remains at increased risk for the development of superimposed preeclampsia.  She is also at increased risk for a pregnancy complicated by fetal growth restriction and  delivery.  She continues treatment with Procardia XL 30 mg daily and low dose aspirin. Aspirin therapy should be continued until 36-37 weeks gestation.  With regard to her BP control, Medina reports home BP of 118-130/70-80.  Her BP is elevated today in the office at 146/80 and 140/82.  She reports no symptoms of preeclampsia. Preeclampsia labs were sent. She was advised to continue her medication as prescribed.  She should continue home BP monitoring and notify your office or her PCP if BP are elevated above baseline. We reviewed dietary and lifestyle modifications to optimize maternal health and pregnancy outcome. We also reviewed the signs and symptoms of preeclampsia and she was advised to call should she have any headache, vision changes, persistent heartburn/epigastric pain or worsening edema.  Serum evaluation of baseline renal and hepatic function as well as a urine protein:creatinine ratio performed on 6/15/23 was normal.  The results of today's evaluation will be reviewed when available. Labs should be repeated with any change in blood pressure or symptoms to evaluate for the development of superimposed preeclampsia. Fetal testing is reassuring and she is scheduled weekly BPP/NST until delivery.  Given her diagnosis of chronic hypertension controlled on medication, delivery may be considered any time after 37 weeks.

## 2023-06-30 NOTE — DATA REVIEWED
[FreeTextEntry1] : TAUS 6/27/23 - cephalic, BPP 8/8, MARIBEL 19.26 cm\par \par TAUS 6/15/23 - EFW 2218 gm (71%)\par \par 6/15/23\par urine p:c 0.2\par Cr 0.48\par AST 15\par ALT 9\par H/H 12.6 / 39.7\par Platelet 217

## 2023-07-06 ENCOUNTER — APPOINTMENT (OUTPATIENT)
Dept: ANTEPARTUM | Facility: CLINIC | Age: 33
End: 2023-07-06
Payer: COMMERCIAL

## 2023-07-06 ENCOUNTER — ASOB RESULT (OUTPATIENT)
Age: 33
End: 2023-07-06

## 2023-07-06 LAB
ALBUMIN SERPL ELPH-MCNC: 4 G/DL
ALP BLD-CCNC: 77 U/L
ALT SERPL-CCNC: 9 U/L
ANION GAP SERPL CALC-SCNC: 25 MMOL/L
AST SERPL-CCNC: 16 U/L
BILIRUB SERPL-MCNC: <0.2 MG/DL
BUN SERPL-MCNC: 11 MG/DL
CALCIUM SERPL-MCNC: 10 MG/DL
CHLORIDE SERPL-SCNC: 99 MMOL/L
CO2 SERPL-SCNC: 16 MMOL/L
CREAT SERPL-MCNC: 0.6 MG/DL
EGFR: 120 ML/MIN/1.73M2
GLUCOSE SERPL-MCNC: 56 MG/DL
POTASSIUM SERPL-SCNC: 3.5 MMOL/L
PROT SERPL-MCNC: 6.5 G/DL
SODIUM SERPL-SCNC: 140 MMOL/L

## 2023-07-06 PROCEDURE — 76818 FETAL BIOPHYS PROFILE W/NST: CPT

## 2023-07-06 PROCEDURE — 76820 UMBILICAL ARTERY ECHO: CPT

## 2023-07-07 ENCOUNTER — APPOINTMENT (OUTPATIENT)
Dept: OBGYN | Facility: CLINIC | Age: 33
End: 2023-07-07
Payer: COMMERCIAL

## 2023-07-07 ENCOUNTER — OUTPATIENT (OUTPATIENT)
Dept: OUTPATIENT SERVICES | Facility: HOSPITAL | Age: 33
LOS: 1 days | End: 2023-07-07
Payer: COMMERCIAL

## 2023-07-07 VITALS
SYSTOLIC BLOOD PRESSURE: 167 MMHG | RESPIRATION RATE: 16 BRPM | TEMPERATURE: 98 F | DIASTOLIC BLOOD PRESSURE: 91 MMHG | HEART RATE: 73 BPM

## 2023-07-07 VITALS
HEIGHT: 66 IN | DIASTOLIC BLOOD PRESSURE: 96 MMHG | WEIGHT: 170.8 LBS | BODY MASS INDEX: 27.45 KG/M2 | SYSTOLIC BLOOD PRESSURE: 156 MMHG

## 2023-07-07 VITALS — TEMPERATURE: 98 F | RESPIRATION RATE: 20 BRPM

## 2023-07-07 DIAGNOSIS — Z98.890 OTHER SPECIFIED POSTPROCEDURAL STATES: Chronic | ICD-10-CM

## 2023-07-07 DIAGNOSIS — O26.899 OTHER SPECIFIED PREGNANCY RELATED CONDITIONS, UNSPECIFIED TRIMESTER: ICD-10-CM

## 2023-07-07 LAB
ALBUMIN SERPL ELPH-MCNC: 3.9 G/DL — SIGNIFICANT CHANGE UP (ref 3.3–5.2)
ALP SERPL-CCNC: 83 U/L — SIGNIFICANT CHANGE UP (ref 40–120)
ALT FLD-CCNC: 9 U/L — SIGNIFICANT CHANGE UP
ANION GAP SERPL CALC-SCNC: 17 MMOL/L — SIGNIFICANT CHANGE UP (ref 5–17)
APPEARANCE UR: CLEAR — SIGNIFICANT CHANGE UP
APTT BLD: 25.6 SEC — LOW (ref 27.5–35.5)
AST SERPL-CCNC: 15 U/L — SIGNIFICANT CHANGE UP
BACTERIA # UR AUTO: ABNORMAL
BASOPHILS # BLD AUTO: 0.02 K/UL — SIGNIFICANT CHANGE UP (ref 0–0.2)
BASOPHILS NFR BLD AUTO: 0.2 % — SIGNIFICANT CHANGE UP (ref 0–2)
BILIRUB SERPL-MCNC: <0.2 MG/DL — LOW (ref 0.4–2)
BILIRUB UR QL STRIP: NORMAL
BILIRUB UR-MCNC: NEGATIVE — SIGNIFICANT CHANGE UP
BUN SERPL-MCNC: 11 MG/DL — SIGNIFICANT CHANGE UP (ref 8–20)
CALCIUM SERPL-MCNC: 8.7 MG/DL — SIGNIFICANT CHANGE UP (ref 8.4–10.5)
CHLORIDE SERPL-SCNC: 101 MMOL/L — SIGNIFICANT CHANGE UP (ref 96–108)
CO2 SERPL-SCNC: 19 MMOL/L — LOW (ref 22–29)
COLOR SPEC: YELLOW — SIGNIFICANT CHANGE UP
CREAT ?TM UR-MCNC: 16 MG/DL — SIGNIFICANT CHANGE UP
CREAT SERPL-MCNC: 0.48 MG/DL — LOW (ref 0.5–1.3)
DIFF PNL FLD: NEGATIVE — SIGNIFICANT CHANGE UP
EGFR: 127 ML/MIN/1.73M2 — SIGNIFICANT CHANGE UP
EOSINOPHIL # BLD AUTO: 0.11 K/UL — SIGNIFICANT CHANGE UP (ref 0–0.5)
EOSINOPHIL NFR BLD AUTO: 1.3 % — SIGNIFICANT CHANGE UP (ref 0–6)
EPI CELLS # UR: SIGNIFICANT CHANGE UP
FIBRINOGEN PPP-MCNC: 570 MG/DL — HIGH (ref 200–450)
GLUCOSE SERPL-MCNC: 81 MG/DL — SIGNIFICANT CHANGE UP (ref 70–99)
GLUCOSE UR QL: NEGATIVE MG/DL — SIGNIFICANT CHANGE UP
GLUCOSE UR-MCNC: NORMAL
HCG UR QL: 0.2 EU/DL
HCT VFR BLD CALC: 39.9 % — SIGNIFICANT CHANGE UP (ref 34.5–45)
HGB BLD-MCNC: 13.9 G/DL — SIGNIFICANT CHANGE UP (ref 11.5–15.5)
HGB UR QL STRIP.AUTO: NORMAL
HIV 1 & 2 AB SERPL IA.RAPID: SIGNIFICANT CHANGE UP
IMM GRANULOCYTES NFR BLD AUTO: 0.2 % — SIGNIFICANT CHANGE UP (ref 0–0.9)
INR BLD: 0.85 RATIO — LOW (ref 0.88–1.16)
KETONES UR-MCNC: NEGATIVE — SIGNIFICANT CHANGE UP
KETONES UR-MCNC: NORMAL
LDH SERPL L TO P-CCNC: 168 U/L — SIGNIFICANT CHANGE UP (ref 98–192)
LEUKOCYTE ESTERASE UR QL STRIP: ABNORMAL
LEUKOCYTE ESTERASE UR-ACNC: ABNORMAL
LYMPHOCYTES # BLD AUTO: 1.88 K/UL — SIGNIFICANT CHANGE UP (ref 1–3.3)
LYMPHOCYTES # BLD AUTO: 21.9 % — SIGNIFICANT CHANGE UP (ref 13–44)
MCHC RBC-ENTMCNC: 32 PG — SIGNIFICANT CHANGE UP (ref 27–34)
MCHC RBC-ENTMCNC: 34.8 GM/DL — SIGNIFICANT CHANGE UP (ref 32–36)
MCV RBC AUTO: 91.7 FL — SIGNIFICANT CHANGE UP (ref 80–100)
MONOCYTES # BLD AUTO: 0.84 K/UL — SIGNIFICANT CHANGE UP (ref 0–0.9)
MONOCYTES NFR BLD AUTO: 9.8 % — SIGNIFICANT CHANGE UP (ref 2–14)
NEUTROPHILS # BLD AUTO: 5.71 K/UL — SIGNIFICANT CHANGE UP (ref 1.8–7.4)
NEUTROPHILS NFR BLD AUTO: 66.6 % — SIGNIFICANT CHANGE UP (ref 43–77)
NITRITE UR QL STRIP: NORMAL
NITRITE UR-MCNC: NEGATIVE — SIGNIFICANT CHANGE UP
PH UR STRIP: 5.5
PH UR: 6.5 — SIGNIFICANT CHANGE UP (ref 5–8)
PLATELET # BLD AUTO: 230 K/UL — SIGNIFICANT CHANGE UP (ref 150–400)
POTASSIUM SERPL-MCNC: 3.9 MMOL/L — SIGNIFICANT CHANGE UP (ref 3.5–5.3)
POTASSIUM SERPL-SCNC: 3.9 MMOL/L — SIGNIFICANT CHANGE UP (ref 3.5–5.3)
PROT ?TM UR-MCNC: <4 MG/DL — SIGNIFICANT CHANGE UP (ref 0–12)
PROT SERPL-MCNC: 6.4 G/DL — LOW (ref 6.6–8.7)
PROT UR STRIP-MCNC: NORMAL
PROT UR-MCNC: NEGATIVE — SIGNIFICANT CHANGE UP
PROT/CREAT UR-RTO: <0.2 RATIO — SIGNIFICANT CHANGE UP
PROTHROM AB SERPL-ACNC: 9.8 SEC — LOW (ref 10.5–13.4)
RBC # BLD: 4.35 M/UL — SIGNIFICANT CHANGE UP (ref 3.8–5.2)
RBC # FLD: 12.1 % — SIGNIFICANT CHANGE UP (ref 10.3–14.5)
RBC CASTS # UR COMP ASSIST: SIGNIFICANT CHANGE UP /HPF (ref 0–4)
SODIUM SERPL-SCNC: 137 MMOL/L — SIGNIFICANT CHANGE UP (ref 135–145)
SP GR SPEC: 1.01 — SIGNIFICANT CHANGE UP (ref 1.01–1.02)
SP GR UR STRIP: <=1.005
URATE SERPL-MCNC: 2.6 MG/DL — SIGNIFICANT CHANGE UP (ref 2.4–5.7)
UROBILINOGEN FLD QL: NEGATIVE MG/DL — SIGNIFICANT CHANGE UP
WBC # BLD: 8.58 K/UL — SIGNIFICANT CHANGE UP (ref 3.8–10.5)
WBC # FLD AUTO: 8.58 K/UL — SIGNIFICANT CHANGE UP (ref 3.8–10.5)
WBC UR QL: SIGNIFICANT CHANGE UP /HPF (ref 0–5)

## 2023-07-07 PROCEDURE — 0502F SUBSEQUENT PRENATAL CARE: CPT

## 2023-07-07 PROCEDURE — 85025 COMPLETE CBC W/AUTO DIFF WBC: CPT

## 2023-07-07 PROCEDURE — 85610 PROTHROMBIN TIME: CPT

## 2023-07-07 PROCEDURE — 80053 COMPREHEN METABOLIC PANEL: CPT

## 2023-07-07 PROCEDURE — 87389 HIV-1 AG W/HIV-1&-2 AB AG IA: CPT

## 2023-07-07 PROCEDURE — 86703 HIV-1/HIV-2 1 RESULT ANTBDY: CPT

## 2023-07-07 PROCEDURE — 99222 1ST HOSP IP/OBS MODERATE 55: CPT | Mod: 25,GC

## 2023-07-07 PROCEDURE — 86780 TREPONEMA PALLIDUM: CPT

## 2023-07-07 PROCEDURE — 81001 URINALYSIS AUTO W/SCOPE: CPT

## 2023-07-07 PROCEDURE — G0463: CPT

## 2023-07-07 PROCEDURE — 36415 COLL VENOUS BLD VENIPUNCTURE: CPT

## 2023-07-07 PROCEDURE — 59025 FETAL NON-STRESS TEST: CPT | Mod: 26

## 2023-07-07 PROCEDURE — 85730 THROMBOPLASTIN TIME PARTIAL: CPT

## 2023-07-07 PROCEDURE — 59025 FETAL NON-STRESS TEST: CPT

## 2023-07-07 PROCEDURE — 84156 ASSAY OF PROTEIN URINE: CPT

## 2023-07-07 PROCEDURE — 83615 LACTATE (LD) (LDH) ENZYME: CPT

## 2023-07-07 PROCEDURE — 85384 FIBRINOGEN ACTIVITY: CPT

## 2023-07-07 PROCEDURE — 82570 ASSAY OF URINE CREATININE: CPT

## 2023-07-07 PROCEDURE — 84550 ASSAY OF BLOOD/URIC ACID: CPT

## 2023-07-07 NOTE — OB PROVIDER TRIAGE NOTE - ATTENDING COMMENTS
33y  at 35w3d GA with cHTN who presents from office for elevated blood pressures but no s/s pre-eclampsia. Continue twice weekly follow up, return precautions discussed.  Impression NST: reactive

## 2023-07-07 NOTE — OB RN TRIAGE NOTE - NSICDXPASTMEDICALHX_GEN_ALL_CORE_FT
PAST MEDICAL HISTORY:  Essential hypertension      PAST MEDICAL HISTORY:  Essential hypertension      (normal spontaneous vaginal delivery)

## 2023-07-07 NOTE — OB PROVIDER TRIAGE NOTE - HISTORY OF PRESENT ILLNESS
ALICIA OSCAR is a 33y  at 35w3d GA with cHTN who presents to L&D from Dr. Maravilla's office for elevated blood pressures.      Pt denies vaginal bleeding, contractions, or leakage of fluid. She endorses good fetal movement.     Pt denies trauma.    Pt denies headaches, visual disturbances, RUQ pain, epigastric pain, or new-onset edema.     She denies any urinary complaints.     She denies fevers, chills, nausea, vomiting.     She denies shortness of breath, chest pain, and palpitations.    Pregnancy course is significant for: cHTN    POB:  G1:  38w GA (Oct 2020)   PGYN: -fibroids/-cysts, denied STD hx, denies abnormal PAPs   PMH: chronic HTN on Procardia 30  PSH: Lasik  SH: Denies tobacco use, EtOH use and illicit drug use during the pregnancy; Feels safe at home  Meds: Procardia 30, ASA 81, PNVs  All: NKDA

## 2023-07-07 NOTE — OB PROVIDER TRIAGE NOTE - NSPREVIOUSLIVEBIRTHSNOWLIVING_OBGYN_ALL_OB_NU
[employer] breast form signed & dated by Dr. GROVE 5.7.20   Faxed form to [employer] & confirmation fax received.          1

## 2023-07-07 NOTE — OB PROVIDER TRIAGE NOTE - NSHPPHYSICALEXAM_GEN_ALL_CORE
T(C): 36.7 (07-07-23 @ 18:53), Max: 36.8 (07-07-23 @ 18:35)  HR: 70 (07-07-23 @ 20:04) (70 - 89)  BP: 131/81 (07-07-23 @ 20:04) (125/78 - 167/91)  RR: 16 (07-07-23 @ 18:35) (16 - 16)  SpO2: --    Gen: NAD, well-appearing, AAOx3  Heart: S1 S2, RRR  Lungs: CTAB  Abd: soft, gravid  Ext: non-edematous, non-tender   SVE/SSE: deferred  FHT: 140bpm, +accels, no decels. Mod variability.  [place]: uterine irritability, no contractions

## 2023-07-07 NOTE — OB PROVIDER TRIAGE NOTE - NSOBPROVIDERNOTE_OBGYN_ALL_OB_FT
A/P:   - BPs 140s-160s mmHg systolic in outpatient office  - Rule-out superimposed pre-eclampsia on cHTN  - PEC labs: wnl  - Monitor BP  - Continuous fetal monitoring  - Blood pressures in triage: 167/91 mmHg then repeat BPs: 130s/80s mmHg  - Reassuring FHT, patient discharge home with follow-up outpatient  - Pt has blood pressure cuff at home. Monitor BPs  - Return to hospital if patient develops unremitting headache, vision changes, abdominal pain, new onset edema, or shortness of breath. Return to hospital if patient develops vaginal bleeding, leakage of fluid, or decreased fetal movement.     Fetus: Cat I. 140bpm, +accels, no decels. Mod variability   [place]: Uterine irritability, not kimberly  Dispo: Discharge home    Discussed with Dr. Pineda

## 2023-07-08 LAB
HIV 1+2 AB+HIV1 P24 AG SERPL QL IA: SIGNIFICANT CHANGE UP
T PALLIDUM AB TITR SER: NEGATIVE — SIGNIFICANT CHANGE UP

## 2023-07-10 ENCOUNTER — NON-APPOINTMENT (OUTPATIENT)
Age: 33
End: 2023-07-10

## 2023-07-10 DIAGNOSIS — Z3A.35 35 WEEKS GESTATION OF PREGNANCY: ICD-10-CM

## 2023-07-10 DIAGNOSIS — O09.523 SUPERVISION OF ELDERLY MULTIGRAVIDA, THIRD TRIMESTER: ICD-10-CM

## 2023-07-10 DIAGNOSIS — O10.013 PRE-EXISTING ESSENTIAL HYPERTENSION COMPLICATING PREGNANCY, THIRD TRIMESTER: ICD-10-CM

## 2023-07-10 LAB
GP B STREP DNA SPEC QL NAA+PROBE: NOT DETECTED
SOURCE GBS: NORMAL

## 2023-07-11 ENCOUNTER — APPOINTMENT (OUTPATIENT)
Dept: OBGYN | Facility: CLINIC | Age: 33
End: 2023-07-11
Payer: COMMERCIAL

## 2023-07-11 VITALS
WEIGHT: 170.8 LBS | HEIGHT: 66 IN | BODY MASS INDEX: 27.45 KG/M2 | SYSTOLIC BLOOD PRESSURE: 146 MMHG | DIASTOLIC BLOOD PRESSURE: 92 MMHG

## 2023-07-11 LAB
BILIRUB UR QL STRIP: NORMAL
GLUCOSE UR-MCNC: NORMAL
HCG UR QL: 0.2 EU/DL
HGB UR QL STRIP.AUTO: ABNORMAL
KETONES UR-MCNC: NORMAL
LEUKOCYTE ESTERASE UR QL STRIP: ABNORMAL
NITRITE UR QL STRIP: NORMAL
PH UR STRIP: 7
PROT UR STRIP-MCNC: NORMAL
SP GR UR STRIP: 1.01

## 2023-07-11 PROCEDURE — 36415 COLL VENOUS BLD VENIPUNCTURE: CPT

## 2023-07-11 PROCEDURE — 0502F SUBSEQUENT PRENATAL CARE: CPT

## 2023-07-12 LAB
ALBUMIN SERPL ELPH-MCNC: 3.7 G/DL
ALP BLD-CCNC: 91 U/L
ALT SERPL-CCNC: 10 U/L
ANION GAP SERPL CALC-SCNC: 16 MMOL/L
AST SERPL-CCNC: 16 U/L
BILIRUB SERPL-MCNC: 0.2 MG/DL
BUN SERPL-MCNC: 7 MG/DL
CALCIUM SERPL-MCNC: 9 MG/DL
CHLORIDE SERPL-SCNC: 101 MMOL/L
CO2 SERPL-SCNC: 22 MMOL/L
CREAT SERPL-MCNC: 0.55 MG/DL
EGFR: 123 ML/MIN/1.73M2
GLUCOSE SERPL-MCNC: 39 MG/DL
HCT VFR BLD CALC: 43.1 %
HGB BLD-MCNC: 14 G/DL
MCHC RBC-ENTMCNC: 32 PG
MCHC RBC-ENTMCNC: 32.5 GM/DL
MCV RBC AUTO: 98.4 FL
PLATELET # BLD AUTO: 214 K/UL
POTASSIUM SERPL-SCNC: 3.9 MMOL/L
PROT SERPL-MCNC: 6 G/DL
RBC # BLD: 4.38 M/UL
RBC # FLD: 12.7 %
SODIUM SERPL-SCNC: 139 MMOL/L
WBC # FLD AUTO: 7.92 K/UL

## 2023-07-13 ENCOUNTER — APPOINTMENT (OUTPATIENT)
Dept: ANTEPARTUM | Facility: CLINIC | Age: 33
End: 2023-07-13
Payer: COMMERCIAL

## 2023-07-13 ENCOUNTER — ASOB RESULT (OUTPATIENT)
Age: 33
End: 2023-07-13

## 2023-07-13 PROCEDURE — 76818 FETAL BIOPHYS PROFILE W/NST: CPT

## 2023-07-13 PROCEDURE — 76816 OB US FOLLOW-UP PER FETUS: CPT

## 2023-07-13 PROCEDURE — 76820 UMBILICAL ARTERY ECHO: CPT | Mod: 59

## 2023-07-13 PROCEDURE — ZZZZZ: CPT

## 2023-07-14 ENCOUNTER — NON-APPOINTMENT (OUTPATIENT)
Age: 33
End: 2023-07-14

## 2023-07-14 ENCOUNTER — APPOINTMENT (OUTPATIENT)
Dept: OBGYN | Facility: CLINIC | Age: 33
End: 2023-07-14

## 2023-07-14 RX ORDER — OXYTOCIN 10 UNIT/ML
333.33 VIAL (ML) INJECTION
Qty: 20 | Refills: 0 | Status: DISCONTINUED | OUTPATIENT
Start: 2023-07-17 | End: 2023-07-20

## 2023-07-14 RX ORDER — CHLORHEXIDINE GLUCONATE 213 G/1000ML
1 SOLUTION TOPICAL DAILY
Refills: 0 | Status: DISCONTINUED | OUTPATIENT
Start: 2023-07-17 | End: 2023-07-18

## 2023-07-14 RX ORDER — SODIUM CHLORIDE 9 MG/ML
1000 INJECTION, SOLUTION INTRAVENOUS
Refills: 0 | Status: DISCONTINUED | OUTPATIENT
Start: 2023-07-17 | End: 2023-07-18

## 2023-07-17 ENCOUNTER — INPATIENT (INPATIENT)
Facility: HOSPITAL | Age: 33
LOS: 2 days | Discharge: HOME | End: 2023-07-20
Attending: STUDENT IN AN ORGANIZED HEALTH CARE EDUCATION/TRAINING PROGRAM | Admitting: STUDENT IN AN ORGANIZED HEALTH CARE EDUCATION/TRAINING PROGRAM
Payer: COMMERCIAL

## 2023-07-17 ENCOUNTER — NON-APPOINTMENT (OUTPATIENT)
Age: 33
End: 2023-07-17

## 2023-07-17 ENCOUNTER — RESULT REVIEW (OUTPATIENT)
Age: 33
End: 2023-07-17

## 2023-07-17 ENCOUNTER — APPOINTMENT (OUTPATIENT)
Dept: OBGYN | Facility: HOSPITAL | Age: 33
End: 2023-07-17

## 2023-07-17 VITALS — HEART RATE: 71 BPM | DIASTOLIC BLOOD PRESSURE: 88 MMHG | SYSTOLIC BLOOD PRESSURE: 137 MMHG

## 2023-07-17 DIAGNOSIS — Z98.890 OTHER SPECIFIED POSTPROCEDURAL STATES: Chronic | ICD-10-CM

## 2023-07-17 DIAGNOSIS — O48 LATE PREGNANCY: ICD-10-CM

## 2023-07-17 PROCEDURE — 88307 TISSUE EXAM BY PATHOLOGIST: CPT | Mod: 26

## 2023-07-17 RX ORDER — CITRIC ACID/SODIUM CITRATE 300-500 MG
30 SOLUTION, ORAL ORAL ONCE
Refills: 0 | Status: COMPLETED | OUTPATIENT
Start: 2023-07-17 | End: 2023-07-18

## 2023-07-17 NOTE — OB RN PATIENT PROFILE - FALL HARM RISK - UNIVERSAL INTERVENTIONS
Bed in lowest position, wheels locked, appropriate side rails in place/Call bell, personal items and telephone in reach/Instruct patient to call for assistance before getting out of bed or chair/Non-slip footwear when patient is out of bed/[place] to call system/Physically safe environment - no spills, clutter or unnecessary equipment/Purposeful Proactive Rounding/Room/bathroom lighting operational, light cord in reach

## 2023-07-17 NOTE — OB RN PATIENT PROFILE - TOBACCO USE
Pt presents with parents for evaluation of cough that started Saturday and fever started yesterday.  Eating ok; urinating well.  Using tylenol. Last dose at 8 am.  3 year old sister at home has had fever and double ear infection this last week.   
Ta is brought in by his parents for evaluation of a cough and fever for the past couple of days. He's eating and drinking well and is having frequent wet diapers. He does have a 3-year-old sister at home with the fever and ear infection last week. They did give him Tylenol 3 hours prior to arrival.    I have reviewed the patient's medications and allergies, past medical, surgical, social and family history, updating these as appropriate. See Histories section of the EMR for a display of this information.     Dr. Gonzalez supervising.    The remainder of the review of systems is negative except as outlined in the HPI.        EXAM: Patient is alert,  in no acute distress.  Skin color and turgor are normal.  Vital signs reviewed.    Vitals:    12/25/17 1043   Pulse: 145   Resp: 30   Temp: (!) 100 °F (37.8 °C)   TempSrc: Temporal Artery   SpO2: 99%   Weight: 9.25 kg       Constitutional:  Well developed, well nourished, no acute distress, non-toxic appearance   Eyes:  PERRL, conjunctiva normal   HENT:  Atraumatic, external ears normal, nose congested with clear drainage, oropharynx moist, no pharyngeal exudates. Neck- normal range of motion, no tenderness, supple   Respiratory:  No respiratory distress, normal breath sounds, no rales, no wheezing   Cardiovascular:  Normal rate, normal rhythm, no murmurs, no gallops, no rubs     Assessment and plan: Viral upper respiratory infection. Conservative treatment discussed. Follow-up with their primary provider if worsening.          
Never smoker

## 2023-07-18 DIAGNOSIS — Z3A.37 37 WEEKS GESTATION OF PREGNANCY: ICD-10-CM

## 2023-07-18 DIAGNOSIS — I10 ESSENTIAL (PRIMARY) HYPERTENSION: ICD-10-CM

## 2023-07-18 LAB
ALBUMIN SERPL ELPH-MCNC: 3.5 G/DL — SIGNIFICANT CHANGE UP (ref 3.3–5.2)
ALP SERPL-CCNC: 94 U/L — SIGNIFICANT CHANGE UP (ref 40–120)
ALT FLD-CCNC: 6 U/L — SIGNIFICANT CHANGE UP
ANION GAP SERPL CALC-SCNC: 15 MMOL/L — SIGNIFICANT CHANGE UP (ref 5–17)
APPEARANCE UR: CLEAR — SIGNIFICANT CHANGE UP
APTT BLD: 24.7 SEC — LOW (ref 27.5–35.5)
AST SERPL-CCNC: 17 U/L — SIGNIFICANT CHANGE UP
BACTERIA # UR AUTO: ABNORMAL
BASOPHILS # BLD AUTO: 0.02 K/UL — SIGNIFICANT CHANGE UP (ref 0–0.2)
BASOPHILS NFR BLD AUTO: 0.2 % — SIGNIFICANT CHANGE UP (ref 0–2)
BILIRUB SERPL-MCNC: <0.2 MG/DL — LOW (ref 0.4–2)
BILIRUB UR-MCNC: NEGATIVE — SIGNIFICANT CHANGE UP
BLD GP AB SCN SERPL QL: SIGNIFICANT CHANGE UP
BUN SERPL-MCNC: 11.8 MG/DL — SIGNIFICANT CHANGE UP (ref 8–20)
CALCIUM SERPL-MCNC: 9.2 MG/DL — SIGNIFICANT CHANGE UP (ref 8.4–10.5)
CHLORIDE SERPL-SCNC: 101 MMOL/L — SIGNIFICANT CHANGE UP (ref 96–108)
CO2 SERPL-SCNC: 21 MMOL/L — LOW (ref 22–29)
COD CRY URNS QL: ABNORMAL
COLOR SPEC: YELLOW — SIGNIFICANT CHANGE UP
CREAT ?TM UR-MCNC: 127 MG/DL — SIGNIFICANT CHANGE UP
CREAT SERPL-MCNC: 0.55 MG/DL — SIGNIFICANT CHANGE UP (ref 0.5–1.3)
DIFF PNL FLD: ABNORMAL
EGFR: 123 ML/MIN/1.73M2 — SIGNIFICANT CHANGE UP
EOSINOPHIL # BLD AUTO: 0.12 K/UL — SIGNIFICANT CHANGE UP (ref 0–0.5)
EOSINOPHIL NFR BLD AUTO: 1.4 % — SIGNIFICANT CHANGE UP (ref 0–6)
EPI CELLS # UR: SIGNIFICANT CHANGE UP
GLUCOSE SERPL-MCNC: 83 MG/DL — SIGNIFICANT CHANGE UP (ref 70–99)
GLUCOSE UR QL: NEGATIVE MG/DL — SIGNIFICANT CHANGE UP
HCT VFR BLD CALC: 40.8 % — SIGNIFICANT CHANGE UP (ref 34.5–45)
HGB BLD-MCNC: 14.4 G/DL — SIGNIFICANT CHANGE UP (ref 11.5–15.5)
IMM GRANULOCYTES NFR BLD AUTO: 0.3 % — SIGNIFICANT CHANGE UP (ref 0–0.9)
INR BLD: 0.87 RATIO — LOW (ref 0.88–1.16)
KETONES UR-MCNC: ABNORMAL
LDH SERPL L TO P-CCNC: 200 U/L — HIGH (ref 98–192)
LEUKOCYTE ESTERASE UR-ACNC: NEGATIVE — SIGNIFICANT CHANGE UP
LYMPHOCYTES # BLD AUTO: 1.94 K/UL — SIGNIFICANT CHANGE UP (ref 1–3.3)
LYMPHOCYTES # BLD AUTO: 22.1 % — SIGNIFICANT CHANGE UP (ref 13–44)
MAGNESIUM SERPL-MCNC: 4.4 MG/DL — HIGH (ref 1.6–2.6)
MAGNESIUM SERPL-MCNC: 5.3 MG/DL — HIGH (ref 1.6–2.6)
MAGNESIUM SERPL-MCNC: 5.7 MG/DL — HIGH (ref 1.6–2.6)
MCHC RBC-ENTMCNC: 32.8 PG — SIGNIFICANT CHANGE UP (ref 27–34)
MCHC RBC-ENTMCNC: 35.3 GM/DL — SIGNIFICANT CHANGE UP (ref 32–36)
MCV RBC AUTO: 92.9 FL — SIGNIFICANT CHANGE UP (ref 80–100)
MONOCYTES # BLD AUTO: 0.96 K/UL — HIGH (ref 0–0.9)
MONOCYTES NFR BLD AUTO: 11 % — SIGNIFICANT CHANGE UP (ref 2–14)
NEUTROPHILS # BLD AUTO: 5.69 K/UL — SIGNIFICANT CHANGE UP (ref 1.8–7.4)
NEUTROPHILS NFR BLD AUTO: 65 % — SIGNIFICANT CHANGE UP (ref 43–77)
NITRITE UR-MCNC: NEGATIVE — SIGNIFICANT CHANGE UP
PH UR: 6 — SIGNIFICANT CHANGE UP (ref 5–8)
PLATELET # BLD AUTO: 240 K/UL — SIGNIFICANT CHANGE UP (ref 150–400)
POTASSIUM SERPL-MCNC: 4 MMOL/L — SIGNIFICANT CHANGE UP (ref 3.5–5.3)
POTASSIUM SERPL-SCNC: 4 MMOL/L — SIGNIFICANT CHANGE UP (ref 3.5–5.3)
PROT ?TM UR-MCNC: 18 MG/DL — HIGH (ref 0–12)
PROT SERPL-MCNC: 6.1 G/DL — LOW (ref 6.6–8.7)
PROT UR-MCNC: 15
PROT/CREAT UR-RTO: 0.1 RATIO — SIGNIFICANT CHANGE UP
PROTHROM AB SERPL-ACNC: 10.1 SEC — LOW (ref 10.5–13.4)
RBC # BLD: 4.39 M/UL — SIGNIFICANT CHANGE UP (ref 3.8–5.2)
RBC # FLD: 12.3 % — SIGNIFICANT CHANGE UP (ref 10.3–14.5)
RBC CASTS # UR COMP ASSIST: SIGNIFICANT CHANGE UP /HPF (ref 0–4)
SODIUM SERPL-SCNC: 137 MMOL/L — SIGNIFICANT CHANGE UP (ref 135–145)
SP GR SPEC: 1.02 — SIGNIFICANT CHANGE UP (ref 1.01–1.02)
T PALLIDUM AB TITR SER: NEGATIVE — SIGNIFICANT CHANGE UP
URATE SERPL-MCNC: 2.9 MG/DL — SIGNIFICANT CHANGE UP (ref 2.4–5.7)
UROBILINOGEN FLD QL: NEGATIVE MG/DL — SIGNIFICANT CHANGE UP
WBC # BLD: 8.76 K/UL — SIGNIFICANT CHANGE UP (ref 3.8–10.5)
WBC # FLD AUTO: 8.76 K/UL — SIGNIFICANT CHANGE UP (ref 3.8–10.5)
WBC UR QL: SIGNIFICANT CHANGE UP /HPF (ref 0–5)

## 2023-07-18 PROCEDURE — 59400 OBSTETRICAL CARE: CPT

## 2023-07-18 RX ORDER — BENZOCAINE 10 %
1 GEL (GRAM) MUCOUS MEMBRANE EVERY 6 HOURS
Refills: 0 | Status: DISCONTINUED | OUTPATIENT
Start: 2023-07-18 | End: 2023-07-20

## 2023-07-18 RX ORDER — NIFEDIPINE 30 MG
30 TABLET, EXTENDED RELEASE 24 HR ORAL DAILY
Refills: 0 | Status: DISCONTINUED | OUTPATIENT
Start: 2023-07-18 | End: 2023-07-18

## 2023-07-18 RX ORDER — LANOLIN
1 OINTMENT (GRAM) TOPICAL EVERY 6 HOURS
Refills: 0 | Status: DISCONTINUED | OUTPATIENT
Start: 2023-07-18 | End: 2023-07-20

## 2023-07-18 RX ORDER — OXYTOCIN 10 UNIT/ML
41.67 VIAL (ML) INJECTION
Qty: 20 | Refills: 0 | Status: DISCONTINUED | OUTPATIENT
Start: 2023-07-18 | End: 2023-07-20

## 2023-07-18 RX ORDER — NIFEDIPINE 30 MG
30 TABLET, EXTENDED RELEASE 24 HR ORAL DAILY
Refills: 0 | Status: DISCONTINUED | OUTPATIENT
Start: 2023-07-18 | End: 2023-07-20

## 2023-07-18 RX ORDER — OXYCODONE HYDROCHLORIDE 5 MG/1
5 TABLET ORAL
Refills: 0 | Status: DISCONTINUED | OUTPATIENT
Start: 2023-07-18 | End: 2023-07-20

## 2023-07-18 RX ORDER — HYDROCORTISONE 1 %
1 OINTMENT (GRAM) TOPICAL EVERY 6 HOURS
Refills: 0 | Status: DISCONTINUED | OUTPATIENT
Start: 2023-07-18 | End: 2023-07-20

## 2023-07-18 RX ORDER — HYDRALAZINE HCL 50 MG
5 TABLET ORAL ONCE
Refills: 0 | Status: COMPLETED | OUTPATIENT
Start: 2023-07-18 | End: 2023-07-18

## 2023-07-18 RX ORDER — SODIUM CHLORIDE 9 MG/ML
3 INJECTION INTRAMUSCULAR; INTRAVENOUS; SUBCUTANEOUS EVERY 8 HOURS
Refills: 0 | Status: DISCONTINUED | OUTPATIENT
Start: 2023-07-18 | End: 2023-07-20

## 2023-07-18 RX ORDER — IBUPROFEN 200 MG
600 TABLET ORAL EVERY 6 HOURS
Refills: 0 | Status: DISCONTINUED | OUTPATIENT
Start: 2023-07-18 | End: 2023-07-20

## 2023-07-18 RX ORDER — OXYCODONE HYDROCHLORIDE 5 MG/1
5 TABLET ORAL ONCE
Refills: 0 | Status: DISCONTINUED | OUTPATIENT
Start: 2023-07-18 | End: 2023-07-20

## 2023-07-18 RX ORDER — KETOROLAC TROMETHAMINE 30 MG/ML
30 SYRINGE (ML) INJECTION ONCE
Refills: 0 | Status: DISCONTINUED | OUTPATIENT
Start: 2023-07-18 | End: 2023-07-18

## 2023-07-18 RX ORDER — MAGNESIUM SULFATE 500 MG/ML
2 VIAL (ML) INJECTION
Qty: 40 | Refills: 0 | Status: DISCONTINUED | OUTPATIENT
Start: 2023-07-18 | End: 2023-07-18

## 2023-07-18 RX ORDER — IBUPROFEN 200 MG
600 TABLET ORAL EVERY 6 HOURS
Refills: 0 | Status: COMPLETED | OUTPATIENT
Start: 2023-07-18 | End: 2024-06-15

## 2023-07-18 RX ORDER — DIBUCAINE 1 %
1 OINTMENT (GRAM) RECTAL EVERY 6 HOURS
Refills: 0 | Status: DISCONTINUED | OUTPATIENT
Start: 2023-07-18 | End: 2023-07-20

## 2023-07-18 RX ORDER — MAGNESIUM HYDROXIDE 400 MG/1
30 TABLET, CHEWABLE ORAL
Refills: 0 | Status: DISCONTINUED | OUTPATIENT
Start: 2023-07-18 | End: 2023-07-20

## 2023-07-18 RX ORDER — ACETAMINOPHEN 500 MG
975 TABLET ORAL
Refills: 0 | Status: DISCONTINUED | OUTPATIENT
Start: 2023-07-18 | End: 2023-07-20

## 2023-07-18 RX ORDER — SIMETHICONE 80 MG/1
80 TABLET, CHEWABLE ORAL EVERY 4 HOURS
Refills: 0 | Status: DISCONTINUED | OUTPATIENT
Start: 2023-07-18 | End: 2023-07-20

## 2023-07-18 RX ORDER — DIPHENHYDRAMINE HCL 50 MG
25 CAPSULE ORAL EVERY 6 HOURS
Refills: 0 | Status: DISCONTINUED | OUTPATIENT
Start: 2023-07-18 | End: 2023-07-20

## 2023-07-18 RX ORDER — PRAMOXINE HYDROCHLORIDE 150 MG/15G
1 AEROSOL, FOAM RECTAL EVERY 4 HOURS
Refills: 0 | Status: DISCONTINUED | OUTPATIENT
Start: 2023-07-18 | End: 2023-07-20

## 2023-07-18 RX ORDER — MAGNESIUM SULFATE 500 MG/ML
2 VIAL (ML) INJECTION
Qty: 40 | Refills: 0 | Status: DISCONTINUED | OUTPATIENT
Start: 2023-07-18 | End: 2023-07-19

## 2023-07-18 RX ORDER — SODIUM CHLORIDE 9 MG/ML
1000 INJECTION, SOLUTION INTRAVENOUS ONCE
Refills: 0 | Status: COMPLETED | OUTPATIENT
Start: 2023-07-18 | End: 2023-07-18

## 2023-07-18 RX ORDER — MAGNESIUM SULFATE 500 MG/ML
4 VIAL (ML) INJECTION ONCE
Refills: 0 | Status: COMPLETED | OUTPATIENT
Start: 2023-07-18 | End: 2023-07-18

## 2023-07-18 RX ORDER — AER TRAVELER 0.5 G/1
1 SOLUTION RECTAL; TOPICAL EVERY 4 HOURS
Refills: 0 | Status: DISCONTINUED | OUTPATIENT
Start: 2023-07-18 | End: 2023-07-20

## 2023-07-18 RX ORDER — OXYTOCIN 10 UNIT/ML
2 VIAL (ML) INJECTION
Qty: 30 | Refills: 0 | Status: DISCONTINUED | OUTPATIENT
Start: 2023-07-18 | End: 2023-07-20

## 2023-07-18 RX ORDER — LABETALOL HCL 100 MG
200 TABLET ORAL
Refills: 0 | Status: DISCONTINUED | OUTPATIENT
Start: 2023-07-18 | End: 2023-07-20

## 2023-07-18 RX ORDER — OXYTOCIN 10 UNIT/ML
VIAL (ML) INJECTION
Qty: 30 | Refills: 0 | Status: DISCONTINUED | OUTPATIENT
Start: 2023-07-18 | End: 2023-07-18

## 2023-07-18 RX ORDER — TETANUS TOXOID, REDUCED DIPHTHERIA TOXOID AND ACELLULAR PERTUSSIS VACCINE, ADSORBED 5; 2.5; 8; 8; 2.5 [IU]/.5ML; [IU]/.5ML; UG/.5ML; UG/.5ML; UG/.5ML
0.5 SUSPENSION INTRAMUSCULAR ONCE
Refills: 0 | Status: DISCONTINUED | OUTPATIENT
Start: 2023-07-18 | End: 2023-07-20

## 2023-07-18 RX ADMIN — Medication 200 MILLIGRAM(S): at 17:13

## 2023-07-18 RX ADMIN — Medication 975 MILLIGRAM(S): at 21:13

## 2023-07-18 RX ADMIN — Medication 300 GRAM(S): at 03:26

## 2023-07-18 RX ADMIN — Medication 30 MILLILITER(S): at 09:00

## 2023-07-18 RX ADMIN — Medication 50 GM/HR: at 03:46

## 2023-07-18 RX ADMIN — Medication 2 MILLIUNIT(S)/MIN: at 12:22

## 2023-07-18 RX ADMIN — Medication 5 MILLIGRAM(S): at 03:06

## 2023-07-18 RX ADMIN — Medication 30 MILLIGRAM(S): at 16:41

## 2023-07-18 RX ADMIN — Medication 50 GM/HR: at 22:06

## 2023-07-18 RX ADMIN — Medication 2 MILLIUNIT(S)/MIN: at 02:20

## 2023-07-18 RX ADMIN — SODIUM CHLORIDE 1000 MILLILITER(S): 9 INJECTION, SOLUTION INTRAVENOUS at 09:00

## 2023-07-18 RX ADMIN — Medication 30 MILLIGRAM(S): at 08:12

## 2023-07-18 NOTE — CHART NOTE - NSCHARTNOTEFT_GEN_A_CORE
Examined for progress  SVE 5/80/-2  FHR category I  toco: irregular  Restarting pitocin  Epidural in place
examined for increased pressure and discomfort  SVE 10/100/1  FHR category I  toco: q2-3 mins  continue pitocin  continue magnesium  Anticipate delivery.

## 2023-07-18 NOTE — OB PROVIDER H&P - ASSESSMENT
A/P:    ALICIA OSCAR is a 32yo F  at 37wk1d GA who presents to L&D for scheduled induction of labor 2/2 chronic hypertension. She is comfortable in bed awaiting induction. She is in no acute distress. Due to her current blood pressure being controlled and not severely hypertensive, as well as multiple negative workups for PEC and a lack of overt symptoms, it is unlikely that she is in PEC. Therefore, she will be induced for vaginal delivery after examination, ultrasound, and a discussion of induction method take place, assuming all are unremarkable.    #labor  - examination and ultrasound  - choose method of induction A/P: ALICIA OSCAR is a 32yo  at 37wk1d GA who presents to L&D for scheduled induction of labor 2/2 chronic hypertension. She is comfortable in bed awaiting induction. She is in no acute distress. Due to her current blood pressure being controlled and not severely hypertensive, as well as multiple negative workups for PEC and a lack of overt symptoms, it is unlikely that she is in PEC. Therefore, she will be induced for vaginal delivery after examination, ultrasound, and a discussion of induction method take place, assuming all are unremarkable.    #labor  - examination and ultrasound  - choose method of induction    Patient is a 33 y.o.  at 37 weeks 2 days gestation admitted to L&D for IOL secondary to cHTN on medication. Cat 1 FHT.    - consent  - admission labs  - IV hydration  - continuous toco and fetal heart monitoring  - GBS negative, no ppx required  - induction method: pitocin  - pain management: epidural prn    Discussed with Dr. Pineda

## 2023-07-18 NOTE — OB RN DELIVERY SUMMARY - NSSELHIDDEN_OBGYN_ALL_OB_FT
[NS_DeliveryAttending1_OBGYN_ALL_OB_FT:[id number]],[NS_DeliveryAssist1_OBGYN_ALL_OB_FT:[email or web address]=],[NS_DeliveryRN_OBGYN_ALL_OB_FT:[id number]=]

## 2023-07-18 NOTE — OB PROVIDER H&P - ATTENDING COMMENTS
34yo   at 37wk1d GA who presents to L&D for scheduled induction of labor secondary chronic hypertension, consent for care signed after questions answered.

## 2023-07-18 NOTE — OB PROVIDER H&P - NSHPREVIEWOFSYSTEMS_GEN_ALL_CORE
Pt states she has swelling of her hands and feet intermittently, especially with standing and movement.     Pt denies vaginal bleeding and leakage of fluid. She endorses good fetal movement.     Pt denies headaches, visual disturbances, RUQ pain, epigastric pain and new-onset edema.     She denies any urinary complaints.     She denies fevers, chills, nausea, vomiting.     She denies shortness of breath, chest pain, and palpitations.

## 2023-07-18 NOTE — OB PROVIDER DELIVERY SUMMARY - NSPROVIDERDELIVERYNOTE_OBGYN_ALL_OB_FT
Vaginal Delivery Summary    Procedure: Normal spontaneous vaginal delivery   Findings: Viable female infant delivered in cephalic presentation at 1313, placenta delivered at 1318.  Apgar scores 9/9.   Weight 2920g.  Laceration(s): 1st degree perineal  Repair: with suture  EBL: 101  Complications: nuchal cord x1    Procedure:   Patient felt rectal pressure and was found to be fully dilated, +2 station. She pushed effectively. She delivered a viable female infant. A loose nuchal cord X 1 was reduced on delivery of the shoulders and delayed cord clamping was performed for 30 seconds. Placenta delivered intact and pitocin was started at the delivery. Perineum and vagina were inspected, 1 laceration present on perineum and repaired. Adequate hemostasis was obtained. Fundus was noted to be firm.

## 2023-07-18 NOTE — OB PROVIDER DELIVERY SUMMARY - NSLOWPPHRISK_OBGYN_A_OB
No previous uterine incision/No known bleeding disorder/No history of postpartum hemorrhage/No other PPH risks indicated

## 2023-07-18 NOTE — OB RN DELIVERY SUMMARY - NS_SEPSISRSKCALC_OBGYN_ALL_OB_FT
EOS calculated successfully. EOS Risk Factor: 0.5/1000 live births ([facility][note 3] national incidence); GA=37w2d; Temp=98.2; ROM=4.65; GBS='Negative'; Antibiotics='No antibiotics or any antibiotics < 2 hrs prior to birth'

## 2023-07-18 NOTE — OB PROVIDER H&P - NSHPLABSRESULTS_GEN_ALL_CORE
LABS    ( @ 00:00)                      14.4  8.76 )-----------( 240                 40.8    Neutrophils = 5.69 (65.0%)  Lymphocytes = 1.94 (22.1%)  Eosinophils = 0.12 (1.4%)  Basophils = 0.02 (0.2%)  Monocytes = 0.96 (11.0%)  Bands = --%        137  |  101  |  11.8  ----------------------------<  83  4.0   |  21.0<L>  |  0.55    Ca    9.2      2023 00:00    TPro  6.1<L>  /  Alb  3.5  /  TBili  <0.2<L>  /  DBili  x   /  AST  17  /  ALT  6   /  AlkPhos  94      ( 2023 00:00 )   PT: 10.1 sec;   INR: 0.87 ratio;               Urinalysis Basic - ( 2023 00:00 )    Color: Yellow / Appearance: Clear / S.025 / pH: x  Gluc: 83 mg/dL / Ketone: Trace  / Bili: Negative / Urobili: Negative mg/dL   Blood: x / Protein: 15 / Nitrite: Negative   Leuk Esterase: Negative / RBC: 0-2 /HPF / WBC 0-2 /HPF   Sq Epi: x / Non Sq Epi: x / Bacteria: Few

## 2023-07-18 NOTE — OB PROVIDER DELIVERY SUMMARY - NSSELHIDDEN_OBGYN_ALL_OB_FT
[NS_DeliveryAttending1_OBGYN_ALL_OB_FT:[id number]=],[NS_DeliveryAssist1_OBGYN_ALL_OB_FT:[email or web address]=]

## 2023-07-18 NOTE — OB PROVIDER H&P - NSHPPHYSICALEXAM_GEN_ALL_CORE
T(C): 36.8 (07-17-23 @ 23:39), Max: 36.8 (07-17-23 @ 23:39)  HR: 71 (07-17-23 @ 23:39) (71 - 71)  BP: 138/88 (07-17-23 @ 23:39) (137/88 - 138/88)  RR: 18 (07-17-23 @ 23:39) (18 - 18)  SpO2: --  Gen: NAD, well-appearing  Abd: soft, gravid  Ext: non-edematous, non-tender T(C): 36.8 (07-17-23 @ 23:39), Max: 36.8 (07-17-23 @ 23:39)  HR: 71 (07-17-23 @ 23:39) (71 - 71)  BP: 138/88 (07-17-23 @ 23:39) (137/88 - 138/88)  RR: 18 (07-17-23 @ 23:39) (18 - 18)      Gen: NAD, well-appearing  Abd: soft, gravid, non-tender  Ext: non-edematous, non-tender  SVE: 3.5/70/-2  FHT: 120 bpm, moderate variability, + accels, - decels   Seffner: irregular  Bedside US: vertex, anterior placenta

## 2023-07-18 NOTE — OB PROVIDER LABOR PROGRESS NOTE - ASSESSMENT
Patient examined and AROM'ed at bedside @ 0830  Patient will resume Pitocin for active management of labor Patient examined and AROM'ed at bedside @ 0830  Patient will resume Pitocin for active management of labor  Patient reported she desired epidural for analgesia    Provider was made aware of severe range /88 shortly after exam  Patient was resumed on home Procardia 30mg XL with hold precautions included    Discussed w/ Dr. Aguirre     Patient examined and AROM'ed at bedside @ 0830 - clear fluid noted with some bloody show  Patient will resume Pitocin for active management of labor  Patient reported she desired epidural for analgesia    Provider was made aware of severe range /88 shortly after exam  Patient was resumed on home Procardia 30mg XL with hold precautions included    Discussed w/ Dr. Aguirre     32 yo @37w2d here for IOL for hx cHTN  Patient examined and AROM'ed at bedside @ 0830 - clear fluid noted with some bloody show  Patient will resume Pitocin for active management of labor  Patient reported she desired epidural for analgesia    Provider was made aware of severe range /88 shortly after exam  Patient was resumed on home Procardia 30mg XL with hold precautions included    Discussed w/ Dr. Aguirre

## 2023-07-18 NOTE — OB PROVIDER H&P - HISTORY OF PRESENT ILLNESS
ALICIA OSCAR is a 32yo F  at 37wk1d GA who presents to L&D for scheduled induction of labor 2/2 chronic hypertension. Originally, she was planned to induce at 38wk, but she had multiple BP readings of 150-160/90 in the past week, so the induction was moved up. She states her OBGYN ruled out preeclampsia x3 during that period. She states her contractions come and go randomly. They are not painful. Her last sonogram was , and she states it showed normal growth with head down positioning. She also states that she had an NSD that day, and it was normal. She states that this current pregnancy has been uncomplicated until the concerning BP measurements last week. She is GBS negative as of her 36 wk appointment.    LMP: 10/30/22  KATALINA: 23  EFW: Unknown    Pregnancy course is significant for chronic hypertension.     POB: Last pregnancy and delivery were unremarkable, 38wk vaginal delivery at 7lb5oz, no elevation in BP. No current or past GDM and PEC.  PGYN: -fibroids/-cysts, denied STD hx, denies abnormal PAPs  PMH: None  PSH: LASIK  SH: Denies tobacco use, EtOH use and illicit drug use during the pregnancy; Feels safe at home  Meds: Procardia 30mg daily, ASA 81mg alternating between 1 tablet and 2 tablets daily  All: NKDA   ALICIA OSCAR is a 34yo   at 37wk1d GA who presents to L&D for scheduled induction of labor secondary chronic hypertension.   Originally, she was planned to induce at 38wk, but she had multiple BP readings of 150-160/90 in the past week, so the induction was moved up. She states her OBGYN ruled out preeclampsia x3 during that period. She states her contractions come and go randomly. They are not painful. Her last sonogram was , and she states it showed normal growth with head down positioning. She also states that she had an NSD that day, and it was normal. She states that this current pregnancy has been uncomplicated until the concerning BP measurements last week. She is GBS negative as of her 36 wk appointment.    +FM, - VB, -LOF.     LMP: 10/30/22  KATALINA: 23  EFW: Unknown    Pregnancy course is significant for chronic hypertension.     POB:   - @38wk,  7lb5oz, female, 10/14/2020, [facility]; uncomplicated    PGYN: -fibroids/-cysts, denied STD hx, denies abnormal PAPs  PMH: None  PSH: LASIK  SH: Denies tobacco use, EtOH use and illicit drug use during the pregnancy; Feels safe at home  Meds: Procardia 30mg daily, ASA 81mg alternating between 1 tablet and 2 tablets daily  All: NKDA

## 2023-07-19 LAB
ALBUMIN SERPL ELPH-MCNC: 2.8 G/DL — LOW (ref 3.3–5.2)
ALP SERPL-CCNC: 73 U/L — SIGNIFICANT CHANGE UP (ref 40–120)
ALT FLD-CCNC: 10 U/L — SIGNIFICANT CHANGE UP
ANION GAP SERPL CALC-SCNC: 11 MMOL/L — SIGNIFICANT CHANGE UP (ref 5–17)
AST SERPL-CCNC: 18 U/L — SIGNIFICANT CHANGE UP
BILIRUB SERPL-MCNC: <0.2 MG/DL — LOW (ref 0.4–2)
BUN SERPL-MCNC: 6 MG/DL — LOW (ref 8–20)
CALCIUM SERPL-MCNC: 6.5 MG/DL — CRITICAL LOW (ref 8.4–10.5)
CHLORIDE SERPL-SCNC: 100 MMOL/L — SIGNIFICANT CHANGE UP (ref 96–108)
CO2 SERPL-SCNC: 24 MMOL/L — SIGNIFICANT CHANGE UP (ref 22–29)
CREAT SERPL-MCNC: 0.45 MG/DL — LOW (ref 0.5–1.3)
EGFR: 129 ML/MIN/1.73M2 — SIGNIFICANT CHANGE UP
GLUCOSE SERPL-MCNC: 109 MG/DL — HIGH (ref 70–99)
HCT VFR BLD CALC: 35.1 % — SIGNIFICANT CHANGE UP (ref 34.5–45)
HGB BLD-MCNC: 11.9 G/DL — SIGNIFICANT CHANGE UP (ref 11.5–15.5)
MAGNESIUM SERPL-MCNC: 5.5 MG/DL — HIGH (ref 1.6–2.6)
MAGNESIUM SERPL-MCNC: 5.6 MG/DL — HIGH (ref 1.6–2.6)
POTASSIUM SERPL-MCNC: 3.9 MMOL/L — SIGNIFICANT CHANGE UP (ref 3.5–5.3)
POTASSIUM SERPL-SCNC: 3.9 MMOL/L — SIGNIFICANT CHANGE UP (ref 3.5–5.3)
PROT SERPL-MCNC: 5 G/DL — LOW (ref 6.6–8.7)
SODIUM SERPL-SCNC: 135 MMOL/L — SIGNIFICANT CHANGE UP (ref 135–145)

## 2023-07-19 RX ORDER — MAGNESIUM SULFATE 500 MG/ML
4 VIAL (ML) INJECTION ONCE
Refills: 0 | Status: DISCONTINUED | OUTPATIENT
Start: 2023-07-19 | End: 2023-07-19

## 2023-07-19 RX ORDER — MAGNESIUM SULFATE 500 MG/ML
2 VIAL (ML) INJECTION
Qty: 40 | Refills: 0 | Status: DISCONTINUED | OUTPATIENT
Start: 2023-07-19 | End: 2023-07-19

## 2023-07-19 RX ADMIN — Medication 975 MILLIGRAM(S): at 15:23

## 2023-07-19 RX ADMIN — Medication 50 GM/HR: at 08:32

## 2023-07-19 RX ADMIN — Medication 600 MILLIGRAM(S): at 18:41

## 2023-07-19 RX ADMIN — Medication 200 MILLIGRAM(S): at 06:18

## 2023-07-19 RX ADMIN — Medication 1 TABLET(S): at 12:21

## 2023-07-19 RX ADMIN — Medication 600 MILLIGRAM(S): at 13:20

## 2023-07-19 RX ADMIN — Medication 200 MILLIGRAM(S): at 18:11

## 2023-07-19 RX ADMIN — Medication 600 MILLIGRAM(S): at 00:03

## 2023-07-19 RX ADMIN — Medication 975 MILLIGRAM(S): at 16:20

## 2023-07-19 RX ADMIN — Medication 600 MILLIGRAM(S): at 06:17

## 2023-07-19 RX ADMIN — Medication 600 MILLIGRAM(S): at 23:30

## 2023-07-19 RX ADMIN — Medication 600 MILLIGRAM(S): at 12:21

## 2023-07-19 RX ADMIN — Medication 975 MILLIGRAM(S): at 08:40

## 2023-07-19 RX ADMIN — Medication 975 MILLIGRAM(S): at 21:25

## 2023-07-19 RX ADMIN — Medication 600 MILLIGRAM(S): at 18:11

## 2023-07-19 NOTE — PROGRESS NOTE ADULT - ASSESSMENT
ASSESSMENT:   ALICIA OSCAR is a 33y  s/p  PPD1, c/b cHTNsiPECoMg s/p Hydral 5. Patient has no complaints at this time, is otherwise clinically and hemodynamically stable.    girl is with patient at bedside.    Hgb: 14.4 > 11.9  Rub: I/I    #cHTNsiPECoMg  - Pt BPs have been wnl overnight (100s-120s/60s-70s)  - Currently receiving Labetalol 200mg BID and Procardia 30mg QD  - D/c Mg  - Pt will be referred to Cardio OB outpatient    #Postpartum  - Continue routine post-partum care  - Pain management PRN  - Encourage maternal- bonding    - Diet: Regular, advance as tolerated  - DVT ppx: Ambulation encouraged  - Dispo: Home PPD2 per attending's approval  ASSESSMENT:   ALICIA OSCAR is a 33y  s/p  PPD1, c/b cHTNsiPECoMg s/p Hydral 5. Patient has no complaints at this time, is otherwise clinically and hemodynamically stable.    girl is with patient at bedside.    Hgb: 14.4 > 11.9  Rub: I/I    #cHTNsiPECoMg  - Pt BPs have been wnl overnight (100s-120s/60s-70s)  - Currently receiving Labetalol 200mg BID and Procardia 30mg QD  - D/c Mg 24 hours from delivery  - Pt will be referred to Cardio OB outpatient    #Postpartum  - Continue routine post-partum care  - Pain management PRN  - Encourage maternal- bonding    - Diet: Regular, advance as tolerated  - DVT ppx: Ambulation encouraged  - Dispo: Home PPD2 per attending's approval

## 2023-07-20 ENCOUNTER — APPOINTMENT (OUTPATIENT)
Dept: ANTEPARTUM | Facility: CLINIC | Age: 33
End: 2023-07-20

## 2023-07-20 ENCOUNTER — TRANSCRIPTION ENCOUNTER (OUTPATIENT)
Age: 33
End: 2023-07-20

## 2023-07-20 VITALS
HEART RATE: 72 BPM | RESPIRATION RATE: 16 BRPM | TEMPERATURE: 98 F | OXYGEN SATURATION: 96 % | SYSTOLIC BLOOD PRESSURE: 128 MMHG | DIASTOLIC BLOOD PRESSURE: 80 MMHG

## 2023-07-20 PROCEDURE — 85018 HEMOGLOBIN: CPT

## 2023-07-20 PROCEDURE — 85025 COMPLETE CBC W/AUTO DIFF WBC: CPT

## 2023-07-20 PROCEDURE — 86901 BLOOD TYPING SEROLOGIC RH(D): CPT

## 2023-07-20 PROCEDURE — 84550 ASSAY OF BLOOD/URIC ACID: CPT

## 2023-07-20 PROCEDURE — 36415 COLL VENOUS BLD VENIPUNCTURE: CPT

## 2023-07-20 PROCEDURE — 84156 ASSAY OF PROTEIN URINE: CPT

## 2023-07-20 PROCEDURE — 88307 TISSUE EXAM BY PATHOLOGIST: CPT

## 2023-07-20 PROCEDURE — 86850 RBC ANTIBODY SCREEN: CPT

## 2023-07-20 PROCEDURE — 85730 THROMBOPLASTIN TIME PARTIAL: CPT

## 2023-07-20 PROCEDURE — 85014 HEMATOCRIT: CPT

## 2023-07-20 PROCEDURE — 85610 PROTHROMBIN TIME: CPT

## 2023-07-20 PROCEDURE — 81001 URINALYSIS AUTO W/SCOPE: CPT

## 2023-07-20 PROCEDURE — 83615 LACTATE (LD) (LDH) ENZYME: CPT

## 2023-07-20 PROCEDURE — 83735 ASSAY OF MAGNESIUM: CPT

## 2023-07-20 PROCEDURE — 82570 ASSAY OF URINE CREATININE: CPT

## 2023-07-20 PROCEDURE — 86900 BLOOD TYPING SEROLOGIC ABO: CPT

## 2023-07-20 PROCEDURE — 86780 TREPONEMA PALLIDUM: CPT

## 2023-07-20 PROCEDURE — 80053 COMPREHEN METABOLIC PANEL: CPT

## 2023-07-20 RX ORDER — LABETALOL HCL 100 MG
1 TABLET ORAL
Qty: 0 | Refills: 0 | DISCHARGE
Start: 2023-07-20

## 2023-07-20 RX ORDER — LABETALOL HCL 100 MG
1 TABLET ORAL
Qty: 60 | Refills: 0
Start: 2023-07-20 | End: 2023-08-18

## 2023-07-20 RX ORDER — NIFEDIPINE 30 MG
30 TABLET, EXTENDED RELEASE 24 HR ORAL ONCE
Refills: 0 | Status: COMPLETED | OUTPATIENT
Start: 2023-07-20 | End: 2023-07-20

## 2023-07-20 RX ORDER — NIFEDIPINE 30 MG
1 TABLET, EXTENDED RELEASE 24 HR ORAL
Qty: 30 | Refills: 0
Start: 2023-07-20 | End: 2023-08-18

## 2023-07-20 RX ORDER — IBUPROFEN 200 MG
1 TABLET ORAL
Qty: 0 | Refills: 0 | DISCHARGE
Start: 2023-07-20

## 2023-07-20 RX ORDER — NIFEDIPINE 30 MG
60 TABLET, EXTENDED RELEASE 24 HR ORAL EVERY 24 HOURS
Refills: 0 | Status: DISCONTINUED | OUTPATIENT
Start: 2023-07-21 | End: 2023-07-20

## 2023-07-20 RX ORDER — NIFEDIPINE 30 MG
1 TABLET, EXTENDED RELEASE 24 HR ORAL
Refills: 0 | DISCHARGE

## 2023-07-20 RX ORDER — LABETALOL HCL 100 MG
1 TABLET ORAL
Refills: 0
Start: 2023-07-20

## 2023-07-20 RX ADMIN — Medication 600 MILLIGRAM(S): at 05:02

## 2023-07-20 RX ADMIN — Medication 200 MILLIGRAM(S): at 05:01

## 2023-07-20 RX ADMIN — Medication 975 MILLIGRAM(S): at 03:01

## 2023-07-20 RX ADMIN — Medication 30 MILLIGRAM(S): at 05:01

## 2023-07-20 RX ADMIN — Medication 975 MILLIGRAM(S): at 14:22

## 2023-07-20 RX ADMIN — Medication 1 TABLET(S): at 11:19

## 2023-07-20 RX ADMIN — Medication 30 MILLIGRAM(S): at 10:57

## 2023-07-20 RX ADMIN — Medication 600 MILLIGRAM(S): at 11:19

## 2023-07-20 NOTE — DISCHARGE NOTE OB - HOSPITAL COURSE
She is a 34 yo now  who presented at 37w GA for an induction of labor and had a vaginal delivery that was complicated by superimposed preeclampsia. She had a normal postpartum course and was discharged home in stable condition. Upon discharge she was voiding, tolerating PO, and ambulating.

## 2023-07-20 NOTE — PROGRESS NOTE ADULT - ATTENDING COMMENTS
Patient seen and examined, agree with above. No complaints, s/p 24 hours of Mg already by time of my seeing patient in afternoon, BPs well controlled and on occasion low on current regimen of Procardia XL 30 mg and labetalol 200 mg BID. Labs normal this AM. Continue to monitor BPs, consider removing one agent if BPs still low. Continue routine postpartum care, anticipate d/c home tomorrow if BPs normal.    Randolph Maravilla MD
ALICIA OSCAR is a 33y  s/p  PPD2, c/b cHTN w/ PEC s/p Mg    -denies PEC sx, fundus firm, NT; no complaints  -one elevated BP at 830 AM  -currently on Labetalol 200 mg TID and procardia 30 XL  -will increase procardia 60 XL daily  -will monitor BP throughout the afternoon and evening to see if she needs to remain in the hospital until tomorrow  -she will need short interval BP check in the office early next week  -con't PP care    Dr. Valle  [facility]

## 2023-07-20 NOTE — DISCHARGE NOTE OB - PATIENT PORTAL LINK FT
You can access the FollowMyHealth Patient Portal offered by [facility] by registering at the following website: [email or web address]. By joining [facility]’s FollowMyHealth portal, you will also be able to view your health information using other applications (apps) compatible with our system.

## 2023-07-20 NOTE — DISCHARGE NOTE OB - CARE PROVIDER_API CALL
Cheri Aguirre  Obstetrics and Gynecology  [address] [facility], [address] [address]  Phone: [phone]  Fax: [phone]  Established Patient  Follow Up Time:

## 2023-07-20 NOTE — PROGRESS NOTE ADULT - SUBJECTIVE AND OBJECTIVE BOX
INTERVAL HPI/OVERNIGHT EVENTS:  33y Female s/p labor epidural, CSE, on 7/18/23    Vital Signs Last 24 Hrs  T(C): 36.4 (19 Jul 2023 08:00), Max: 37.1 (18 Jul 2023 19:24)  T(F): 97.5 (19 Jul 2023 08:00), Max: 98.8 (18 Jul 2023 19:24)  HR: 73 (19 Jul 2023 08:30) (69 - 97)  BP: 109/61 (19 Jul 2023 08:30) (101/63 - 166/86)  BP(mean): --  RR: 16 (19 Jul 2023 08:00) (16 - 17)  SpO2: 98% (19 Jul 2023 08:00) (96% - 98%)    Parameters below as of 19 Jul 2023 08:00  Patient On (Oxygen Delivery Method): room air            Patient satisfied    Patient seen and doing well     No headache      No residual numbness or weakness, sensory and motor function intact    Site not examined    No anesthetic complications or complaints noted or reported                 
SUBJECTIVE:  Patient seen and examined at bedside this morning. No acute events overnight. Reports feeling well this morning, denies any headaches, blurry vision, abdominal pain, or nausea. Pain is well controlled with PRN pain medication. Tolerating PO without N/V, voiding spontaneously with no complaints. Denies fevers, chills, shortness of breath, chest pain, or calf pain.    Physical exam:  General: AOx3, NAD.  Heart: S1/S2 with regular rate and normal rhythm.  Lungs: CTABL, no crackles or wheezing.   Abdomen: +BS, Soft, appropriately tender, nondistended, no guarding or rebound tenderness, firm uterine fundus at umbilicus  Ext: BL LE reveal minimal swelling, no popliteal tenderness or skin changes.     Vital Signs Last 24 Hrs  T(C): 36.7 (20 Jul 2023 04:06), Max: 36.9 (19 Jul 2023 15:47)  T(F): 98.1 (20 Jul 2023 04:06), Max: 98.4 (19 Jul 2023 15:47)  HR: 65 (20 Jul 2023 04:06) (64 - 84)  BP: 134/78 (20 Jul 2023 04:06) (105/65 - 143/92                            11.9   x     )-----------( x        ( 19 Jul 2023 04:40 )             35.1     
SUBJECTIVE:  Patient seen and examined at bedside this morning. No acute events overnight. Reports feeling well this morning, denies any headaches, blurry vision, abdominal pain, or nausea. Pain is well controlled with PRN pain medication. Tolerating PO without N/V, voiding spontaneously with no complaints. Denies fevers, chills, shortness of breath, chest pain, or calf pain    OBJECTIVE:  Vital Signs Last 24 Hrs  T(C): 36.5 (19 Jul 2023 05:02), Max: 37.1 (18 Jul 2023 19:24)  T(F): 97.7 (19 Jul 2023 05:02), Max: 98.8 (18 Jul 2023 19:24)  HR: 77 (19 Jul 2023 05:02) (69 - 99)  BP: 128/76 (19 Jul 2023 05:02) (101/63 - 177/90)    Physical exam:  General: AOx3, NAD.  Heart: S1/S2 with regular rate and normal rhythm.  Lungs: CTABL, no crackles or wheezing.   Abdomen: +BS, Soft, appropriately tender, nondistended, no guarding or rebound tenderness, firm uterine fundus at umbilicus  Ext: BL LE reveal minimal swelling, no popliteal tenderness or skin changes.     LABS:                        11.9   x     )-----------( x        ( 19 Jul 2023 04:40 )             35.1     Urinalysis Basic - ( 19 Jul 2023 04:40 )    Color: x / Appearance: x / SG: x / pH: x  Gluc: 109 mg/dL / Ketone: x  / Bili: x / Urobili: x   Blood: x / Protein: x / Nitrite: x   Leuk Esterase: x / RBC: x / WBC x   Sq Epi: x / Non Sq Epi: x / Bacteria: x      Antibody Screen: NEG (07-17-23 @ 23:52)

## 2023-07-20 NOTE — DISCHARGE NOTE OB - CARE PLAN
Principal Discharge DX:	 (normal spontaneous vaginal delivery)  Assessment and plan of treatment:	Patient should transition to regular activity level. Resume regular diet. Patient should follow up with her OB for a postpartum checkup 3-6 weeks after delivery. Patient should call her doctor sooner if she develops a fever or uncontrolled vaginal bleeding. Please call sooner if there are any other concerns.   1

## 2023-07-20 NOTE — DISCHARGE NOTE OB - NS MD DC FALL RISK RISK
For information on Fall & Injury Prevention, visit: [email or web address] OR  [email or web address] OR  https://www.cdc.gov/steadi/patient.html

## 2023-07-20 NOTE — PROGRESS NOTE ADULT - ASSESSMENT
ASSESSMENT:   ALICIA OSCAR is a 33y  s/p  PPD2, c/b cHTNsiPECoMg s/p Hydral 5. Patient has no complaints at this time, is otherwise clinically and hemodynamically stable. Patient feels ready to go home today.  [place] girl is with patient at bedside.    Hgb: 14.4 > 11.9  Rub: I/I    #cHTNsiPECoMg  - Pt BPs have been wnl overnight (120s-130s/80s) max of 143/92  - Currently receiving Labetalol 200mg BID and Procardia 30mg QD  - s/p Mg 24 hours after delivery  - Pt will be referred to Cardio OB outpatient    #Postpartum  - Continue routine post-partum care  - Pain management PRN  - Encourage maternal- bonding    - Diet: Regular, advance as tolerated  - DVT ppx: Ambulation encouraged  - Dispo: Home today per attending's approval  ASSESSMENT:   ALICIA OSCAR is a 33y  s/p  PPD2, c/b cHTN w/ PEC s/p Mg s/p Hydralazine 5. Patient has no complaints at this time, is otherwise clinically and hemodynamically stable. Patient feels ready to go home today.   girl is with patient at bedside.    Hgb: 14.4 > 11.9  Rub: I/I    #cHTNsiPECoMg  - Pt BPs have been wnl overnight (120s-130s/80s) max of 143/92  - Currently receiving Labetalol 200mg BID and Procardia 30mg QD  - s/p Mg 24 hours after delivery  - Pt will be referred to Cardio OB outpatient    #Postpartum  - Continue routine post-partum care  - Pain management PRN  - Encourage maternal- bonding    - Diet: Regular, advance as tolerated  - DVT ppx: Ambulation encouraged  - Dispo: Home today per attending's approval

## 2023-07-20 NOTE — DISCHARGE NOTE OB - MEDICATION SUMMARY - MEDICATIONS TO TAKE
I will START or STAY ON the medications listed below when I get home from the hospital:    ibuprofen 600 mg oral tablet  -- 1 tab(s) by mouth every 6 hours  -- Indication: For Pain    acetaminophen 325 mg oral tablet  -- 2 tab(s) by mouth every 6 hours, As Needed -for mild pain - for moderate pain   -- This product contains acetaminophen.  Do not use  with any other product containing acetaminophen to prevent possible liver damage.    -- Indication: For Pain    labetalol 200 mg oral tablet  -- 1 tab(s) by mouth 2 times a day  -- Indication: For HTN    NIFEdipine (Eqv-Procardia XL) 60 mg oral tablet, extended release  -- 1 tab(s) by mouth once a day  -- Indication: For HTN

## 2023-07-21 ENCOUNTER — NON-APPOINTMENT (OUTPATIENT)
Age: 33
End: 2023-07-21

## 2023-07-21 RX ORDER — NIFEDIPINE 60 MG/1
60 TABLET, EXTENDED RELEASE ORAL DAILY
Refills: 0 | Status: ACTIVE | COMMUNITY
Start: 2023-07-21

## 2023-07-21 RX ORDER — NIFEDIPINE 30 MG/1
30 TABLET, FILM COATED, EXTENDED RELEASE ORAL
Refills: 0 | Status: DISCONTINUED | COMMUNITY
End: 2023-07-21

## 2023-07-21 RX ORDER — ASPIRIN 81 MG
81 TABLET, DELAYED RELEASE (ENTERIC COATED) ORAL
Refills: 0 | Status: DISCONTINUED | COMMUNITY
End: 2023-07-21

## 2023-07-24 ENCOUNTER — NON-APPOINTMENT (OUTPATIENT)
Age: 33
End: 2023-07-24

## 2023-07-26 ENCOUNTER — APPOINTMENT (OUTPATIENT)
Dept: OBGYN | Facility: CLINIC | Age: 33
End: 2023-07-26
Payer: COMMERCIAL

## 2023-07-26 VITALS
DIASTOLIC BLOOD PRESSURE: 76 MMHG | SYSTOLIC BLOOD PRESSURE: 132 MMHG | BODY MASS INDEX: 24.46 KG/M2 | HEIGHT: 66 IN | WEIGHT: 152.2 LBS

## 2023-07-26 PROCEDURE — 0503F POSTPARTUM CARE VISIT: CPT

## 2023-07-26 NOTE — HISTORY OF PRESENT ILLNESS
[Postpartum Follow Up] : postpartum follow up [Delivery Date: ___] : on [unfilled] [] : delivered by vaginal delivery [Female] : Delivery History: baby girl [Wt. ___] : weighing [unfilled] [Complications:___] : complications include: [unfilled] [Breastfeeding] : currently nursing [Back to Normal] : is back to normal in size [None] : no vaginal bleeding [Normal] : the vagina was normal [Doing Well] : is doing well [FreeTextEntry9] : HTN. PEC on medication [de-identified] : Pumping [de-identified] : BPS at home 110-140/80-92, pt Asx [de-identified] : Continue current regimen of 60mg Procardia XL, Labetalol 200mg BID. To monitor BPs PEC sx reviewed

## 2023-07-27 ENCOUNTER — APPOINTMENT (OUTPATIENT)
Dept: ANTEPARTUM | Facility: CLINIC | Age: 33
End: 2023-07-27

## 2023-07-31 ENCOUNTER — NON-APPOINTMENT (OUTPATIENT)
Age: 33
End: 2023-07-31

## 2023-08-03 ENCOUNTER — APPOINTMENT (OUTPATIENT)
Dept: ANTEPARTUM | Facility: CLINIC | Age: 33
End: 2023-08-03

## 2023-08-04 ENCOUNTER — APPOINTMENT (OUTPATIENT)
Dept: CARDIOLOGY | Facility: CLINIC | Age: 33
End: 2023-08-04
Payer: COMMERCIAL

## 2023-08-04 PROCEDURE — 99203 OFFICE O/P NEW LOW 30 MIN: CPT | Mod: 95

## 2023-08-04 NOTE — DISCUSSION/SUMMARY
[FreeTextEntry1] : In summary, Ms. 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 BP:   118- 135/ 80-90  # cHTN  and sPEC:  BP Stable  Continue Labetalol 200 mg bid ( AM / PM ) Advised to hold if BP < 120/80 Advised to take Procardia XL 60 mg at noon  Encouraged Patient to monitor BP at home and keep a log and report results back to us for evaluation. Based on results, we will adjust medications as necessary.  Additionally, encouraged heart healthy diet and exercise as tolerated. Echocardiogram to evaluate wall motion and valvular function  Advised to call should she have complaints of chest pain, abdominal pain, visual disturbances, HA and blood pressure elevated >150/90  Follow up in 4 weeks

## 2023-08-04 NOTE — HISTORY OF PRESENT ILLNESS
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 Patient is adopted, hence FH is unknown. Patient was diagnosed with HTN in her 20s and maintained on Norvasc 10 mg for several years and was transistioned to Procardia when she decided to start family. At the present time denies chest pain, shortness of breath, dizziness, lightheadedness, palpitations or near syncope or syncope, orthopnea, PND and increasing lower extremity edema.  BP:   118- 135/ 80-90  # cHTN  and sPEC:  BP Stable  Continue Labetalol 200 mg bid ( AM / PM )  Advised to take Procardia XL 60 mg at noon  Encouraged Patient to monitor BP at home and keep a log and report results back to us for evaluation. Based on results, we will adjust medications as necessary.  Additionally, encouraged heart healthy diet and exercise as tolerated. Echocardiogram to evaluate wall motion and valvular function  Advised to call should she have complaints of chest pain, abdominal pain, visual disturbances, HA and blood pressure elevated >150/90  Follow up in 4 weeks

## 2023-08-04 NOTE — REASON FOR VISIT
[Home] : at home, [unfilled] , at the time of the visit. [Other Location: e.g. Home (Enter Location, City,State)___] : at [unfilled] [Patient] : the patient [Hypertension] : hypertension [Other: ____] : [unfilled]

## 2023-08-07 ENCOUNTER — NON-APPOINTMENT (OUTPATIENT)
Age: 33
End: 2023-08-07

## 2023-08-09 ENCOUNTER — NON-APPOINTMENT (OUTPATIENT)
Age: 33
End: 2023-08-09

## 2023-08-09 ENCOUNTER — APPOINTMENT (OUTPATIENT)
Dept: OBGYN | Facility: CLINIC | Age: 33
End: 2023-08-09
Payer: COMMERCIAL

## 2023-08-09 VITALS
HEIGHT: 66 IN | SYSTOLIC BLOOD PRESSURE: 140 MMHG | BODY MASS INDEX: 23.72 KG/M2 | WEIGHT: 147.6 LBS | DIASTOLIC BLOOD PRESSURE: 88 MMHG

## 2023-08-09 DIAGNOSIS — O14.10 SEVERE PRE-ECLAMPSIA, UNSPECIFIED TRIMESTER: ICD-10-CM

## 2023-08-09 PROCEDURE — 0503F POSTPARTUM CARE VISIT: CPT

## 2023-08-09 NOTE — HISTORY OF PRESENT ILLNESS
[Last Pap Date: ___] : Last Pap Date: [unfilled] [Delivery Date: ___] : on [unfilled] [] : delivered by vaginal delivery [Female] : Delivery History: baby girl [Wt. ___] : weighing [unfilled] [Postpartum Follow Up] : postpartum follow up [Breastfeeding] : currently nursing [None] : No associated symptoms are reported [Doing Well] : is doing well [FreeTextEntry8] : postpartum visit. PEC. Seen by cardio- On Procardia 60mg, now on Labetolol 100mg TID, skipping doses if <120/80. BPs 120-130/80s at home [de-identified] : pumping and breastfeeding.  [de-identified] : continue recommendations of cardio. plan for postpartum visit.

## 2023-08-17 ENCOUNTER — NON-APPOINTMENT (OUTPATIENT)
Age: 33
End: 2023-08-17

## 2023-08-24 NOTE — DISCHARGE NOTE OB - SEVERE ABDOMINAL/VAGINAL AND/OR RECTAL PAIN
Problem: Safety - Adult  Goal: Free from fall injury  [id number] by Marcelo Pham RN  Outcome: Progressing Statement Selected

## 2023-09-07 ENCOUNTER — APPOINTMENT (OUTPATIENT)
Dept: CARDIOLOGY | Facility: CLINIC | Age: 33
End: 2023-09-07
Payer: COMMERCIAL

## 2023-09-07 PROCEDURE — 99214 OFFICE O/P EST MOD 30 MIN: CPT | Mod: 95

## 2023-09-07 NOTE — DISCUSSION/SUMMARY
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  BP:   117- 134/ 70- 80s   # cHTN  and sPEC:  BP Stable  D/c Labetalol  Advised to take Procardia XL 30 mg bid ( hold if BP < 120/80)   Encouraged Patient to monitor BP at home and keep a log and report results back to us for evaluation. Based on results, we will adjust medications as necessary.  Additionally, encouraged heart healthy diet and exercise as tolerated. Advised to call should she have complaints of chest pain, abdominal pain, visual disturbances, HA and blood pressure elevated >150/90 Stress test and routine blood work next visit.   Follow up in 4 weeks

## 2023-09-07 NOTE — HISTORY OF PRESENT ILLNESS
[FreeTextEntry1] : Patient presents in for 8 week follow up . Off Labetalol -currently on Porcardia XL 60 mg QD. 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 At the present time denies chest pain, shortness of breath, dizziness, lightheadedness, palpitations or near syncope or syncope, orthopnea, PND and increasing lower extremity edema.  BP:   117- 134/ 70- 80s   # cHTN  and sPEC:  BP Stable  D/c Labetalol  Advised to take Procardia XL 30 mg bid ( hold if BP < 120/80)   Encouraged Patient to monitor BP at home and keep a log and report results back to us for evaluation. Based on results, we will adjust medications as necessary.  Additionally, encouraged heart healthy diet and exercise as tolerated. Advised to call should she have complaints of chest pain, abdominal pain, visual disturbances, HA and blood pressure elevated >150/90 Stress test and routine blood work next visit.   Follow up in 4 weeks

## 2023-09-07 NOTE — HISTORY OF PRESENT ILLNESS
[FreeTextEntry1] : Patient presents in for 8 week follow up . Off Labetalol -currently on Porcardia XL 60 mg QD. 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 At the present time denies chest pain, shortness of breath, dizziness, lightheadedness, palpitations or near syncope or syncope, orthopnea, PND and increasing lower extremity edema.  BP:   117- 134/ 70- 80s   # cHTN  and sPEC:  BP Stable  D/c Labetalol  Advised to take Procardia XL 30 mg bid ( hold if BP < 120/80)   Encouraged Patient to monitor BP at home and keep a log and report results back to us for evaluation. Based on results, we will adjust medications as necessary.  Additionally, encouraged heart healthy diet and exercise as tolerated. Advised to call should she have complaints of chest pain, abdominal pain, visual disturbances, HA and blood pressure elevated >150/90 Stress test and routine blood work next visit.   Follow up in 4 weeks

## 2023-09-07 NOTE — DISCUSSION/SUMMARY
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  BP:   117- 134/ 70- 80s   # cHTN  and sPEC:  BP Stable  D/c Labetalol  Advised to take Procardia XL 30 mg bid ( hold if BP < 120/80)   Encouraged Patient to monitor BP at home and keep a log and report results back to us for evaluation. Based on results, we will adjust medications as necessary.  Additionally, encouraged heart healthy diet and exercise as tolerated. Advised to call should she have complaints of chest pain, abdominal pain, visual disturbances, HA and blood pressure elevated >150/90 Stress test and routine blood work next visit.   Follow up in 4 weeks

## 2023-09-07 NOTE — REASON FOR VISIT
[Hypertension] : hypertension [Other: ____] : [unfilled] [Home] : at home, [unfilled] , at the time of the visit. [Other Location: e.g. Home (Enter Location, City,State)___] : at [unfilled] [Patient] : the patient

## 2023-09-11 ENCOUNTER — APPOINTMENT (OUTPATIENT)
Dept: OBGYN | Facility: CLINIC | Age: 33
End: 2023-09-11
Payer: COMMERCIAL

## 2023-09-11 VITALS
BODY MASS INDEX: 22.88 KG/M2 | HEIGHT: 66 IN | SYSTOLIC BLOOD PRESSURE: 116 MMHG | DIASTOLIC BLOOD PRESSURE: 64 MMHG | WEIGHT: 142.4 LBS

## 2023-09-11 DIAGNOSIS — Z3A.34 34 WEEKS GESTATION OF PREGNANCY: ICD-10-CM

## 2023-09-11 DIAGNOSIS — Z34.91 ENCOUNTER FOR SUPERVISION OF NORMAL PREGNANCY, UNSPECIFIED, FIRST TRIMESTER: ICD-10-CM

## 2023-09-11 DIAGNOSIS — O10.919 UNSPECIFIED PRE-EXISTING HYPERTENSION COMPLICATING PREGNANCY, UNSPECIFIED TRIMESTER: ICD-10-CM

## 2023-09-11 DIAGNOSIS — O09.529 SUPERVISION OF ELDERLY MULTIGRAVIDA, UNSPECIFIED TRIMESTER: ICD-10-CM

## 2023-09-11 DIAGNOSIS — N76.0 ACUTE VAGINITIS: ICD-10-CM

## 2023-09-11 DIAGNOSIS — Z34.93 ENCOUNTER FOR SUPERVISION OF NORMAL PREGNANCY, UNSPECIFIED, THIRD TRIMESTER: ICD-10-CM

## 2023-09-11 PROCEDURE — 0503F POSTPARTUM CARE VISIT: CPT

## 2023-09-11 PROCEDURE — 99213 OFFICE O/P EST LOW 20 MIN: CPT

## 2023-09-14 LAB
CANDIDA VAG CYTO: NOT DETECTED
G VAGINALIS+PREV SP MTYP VAG QL MICRO: NOT DETECTED
T VAGINALIS VAG QL WET PREP: NOT DETECTED

## 2023-09-15 PROBLEM — Z3A.34 34 WEEKS GESTATION OF PREGNANCY: Status: RESOLVED | Noted: 2020-09-16 | Resolved: 2023-09-15

## 2023-09-15 PROBLEM — O10.919 CHRONIC HYPERTENSION AFFECTING PREGNANCY: Status: RESOLVED | Noted: 2020-08-17 | Resolved: 2023-09-15

## 2023-09-15 PROBLEM — Z34.93 THIRD TRIMESTER PREGNANCY: Status: RESOLVED | Noted: 2020-08-31 | Resolved: 2023-09-15

## 2023-09-15 PROBLEM — O09.529 AMA (ADVANCED MATERNAL AGE) MULTIGRAVIDA 35+: Status: RESOLVED | Noted: 2023-06-13 | Resolved: 2023-09-15

## 2023-09-15 PROBLEM — Z34.91 ENCOUNTER FOR SUPERVISION OF LOW-RISK PREGNANCY IN FIRST TRIMESTER: Status: RESOLVED | Noted: 2023-01-05 | Resolved: 2023-09-15

## 2023-10-04 LAB — SURGICAL PATHOLOGY STUDY: SIGNIFICANT CHANGE UP

## 2023-12-26 ENCOUNTER — APPOINTMENT (OUTPATIENT)
Dept: DERMATOLOGY | Facility: CLINIC | Age: 33
End: 2023-12-26

## 2024-01-02 ENCOUNTER — RX CHANGE (OUTPATIENT)
Age: 34
End: 2024-01-02

## 2024-01-02 RX ORDER — NIFEDIPINE 30 MG/1
30 TABLET, EXTENDED RELEASE ORAL
Qty: 120 | Refills: 3 | Status: DISCONTINUED | COMMUNITY
Start: 2023-08-04 | End: 2024-01-02

## 2024-02-08 NOTE — OB RN TRIAGE NOTE - NS_TRIAGETIMEOF NOTIFICATION_OBGYN_ALL_OB_DT
Was this fellow to have a follow up with you?  I didn't think so, but wanted to double check.  Wound actually looks good.  Pic in chart.   
07-Jul-2023 19:15

## 2024-03-15 ENCOUNTER — RX RENEWAL (OUTPATIENT)
Age: 34
End: 2024-03-15

## 2024-03-15 RX ORDER — LABETALOL HYDROCHLORIDE 100 MG/1
100 TABLET, FILM COATED ORAL
Qty: 360 | Refills: 1 | Status: ACTIVE | COMMUNITY
Start: 2023-08-04 | End: 1900-01-01

## 2024-05-09 ENCOUNTER — APPOINTMENT (OUTPATIENT)
Dept: OBGYN | Facility: CLINIC | Age: 34
End: 2024-05-09
Payer: COMMERCIAL

## 2024-05-09 VITALS
WEIGHT: 128 LBS | HEIGHT: 66 IN | BODY MASS INDEX: 20.57 KG/M2 | SYSTOLIC BLOOD PRESSURE: 136 MMHG | DIASTOLIC BLOOD PRESSURE: 90 MMHG

## 2024-05-09 DIAGNOSIS — R23.2 FLUSHING: ICD-10-CM

## 2024-05-09 DIAGNOSIS — Z01.419 ENCOUNTER FOR GYNECOLOGICAL EXAMINATION (GENERAL) (ROUTINE) W/OUT ABNORMAL FINDINGS: ICD-10-CM

## 2024-05-09 PROCEDURE — 99213 OFFICE O/P EST LOW 20 MIN: CPT | Mod: 25

## 2024-05-09 PROCEDURE — 99395 PREV VISIT EST AGE 18-39: CPT

## 2024-05-09 PROCEDURE — 36415 COLL VENOUS BLD VENIPUNCTURE: CPT

## 2024-05-14 NOTE — DISCHARGE NOTE OB - REDNESS, SWELLING, YELLOW-GREEN OR BLOODY DISCHARGE FROM YOUR INCISION
MATERNAL FETAL MEDICINE CONSULT FOLLOW-UP    Mame Johnson   2024    Referring: Benitez Ngyuen MD     Mrs. Johnson is a 27 year old   with intrauterine pregnancy at 30w6d who presents today for a follow up fetal growth ultrasound and MFM consult follow-up.  She was seen in this office by Dr. Servin at 20 weeks.  Please refer to that consultation note for details.    High Risk Issues:    BMI > 40  Prior child with bicuspid aortic valve    HPI: Mrs. Johnson denies any leaking fluid, bleeding, or regular uterine contractions. She reports normal fetal movement.    Current Outpatient Medications   Medication Sig Dispense Refill    aspirin 81 MG chewable tablet Chew 81 mg by mouth daily.      Prenatal Vit-Fe Fumarate-FA (multivitamin & mineral w/folic acid- PRENATAL) 27-1 MG Tab Take 1 tablet by mouth daily.       No current facility-administered medications for this visit.         OB History    Para Term  AB Living   3 1 1   1 1   SAB IAB Ectopic Molar Multiple Live Births   1       0 1      # Outcome Date GA Lbr Earl/2nd Weight Sex Delivery Anes PTL Lv   3 Current            2 Term 21 41w0d  2.97 kg (6 lb 8.8 oz) F Vag-Spont EPI N JACQUIE      Birth Comments: child with biscuspid aortic valve   1 SAB 10/20/20 10w0d             Birth Comments: d/c    ?      Review of patient's allergies indicates:      ALLERGIES:   Allergen Reactions    Morphine Nausea & Vomiting        Past Medical History:   Diagnosis Date    Class 2 obesity due to excess calories with body mass index (BMI) of 37.0 to 37.9 in adult     Delayed emergence from general anesthesia     Missed      RAD (reactive airway disease)     sports induced, no attack in years, does not have an inhaler        Past Surgical History:   Procedure Laterality Date    Appendectomy      D and c  10/20/2020    Frenotomy  2009         Family History   Problem Relation Age of Onset    Diabetes Mother     Patient is unaware of any medical  problems Father     Patient is unaware of any medical problems Sister     Congenital Heart Disease Child         bicuspid aortic valve     ?      Social History     Tobacco Use    Smoking status: Never    Smokeless tobacco: Never   Vaping Use    Vaping status: never used   Substance Use Topics    Alcohol use: Not Currently    Drug use: Not Currently    ?      REVIEW OF SYSTEMS:   Headaches: None    Nausea/vomiting:  None    Reports fetal movement: Yes   Abdominal pain/tenderness/cramping/contractions: none   Vaginal bleeding: none   Vaginal leaking of fluid: none     Leg pain/tenderness: none   All other systems negative unless noted in the HPI    PHYSICAL EXAM:   Visit Vitals  /73 (BP Location: LUE - Left upper extremity, Patient Position: Sitting, Cuff Size: Large Adult)   Wt 103.8 kg (228 lb 11.6 oz)   LMP 10/11/2023   BMI 40.52 kg/m²       General: A&Ox3, NAD, pleasant   Lungs: non-labored breathing  Abdomen:  distended by gravid uterus non-tender to palpation  Skin: no rashes   Pelvic Exam: deferred     ULTRASOUND RESULTS  See separate report     Presentation: cephalic  EFW: 1817g (68%ile)  Amniotic Fluid: 7.5cm deepest vertical pocket    Within the limitations of ultrasound, visualized fetal anatomy appears normal, but the study is technically limited by advanced gestational age.     DISCUSSION  Reviewed US findings and plan for care  PPROM/pre-term labor precautions reviewed.  Fetal kick counts reviewed    RECOMMENDATIONS  Recommend follow-up ultrasound at 36 weeks for growth secondary to maternal BMI.  Recommend  surveillance starting at 36-37 weeks secondary to BMI.  Consider maternal echocardiogram  Recommend delivery at 39 weeks' gestation if desired.    Thank you for the opportunity to assist with Mrs. Johnson's obstetrical care. Please do not hesitate to contact me if you have any questions.    Total time, on the day of service, performing chart review, obtaining history and exam,  counseling and documenting/coordinating care:  30 minutes.    A letter regarding this visit has been sent to the referring physician.      Jordana Guerrero MD  [facility]     Statement Selected

## 2024-05-15 LAB
ESTRADIOL SERPL-MCNC: 24 PG/ML
FSH SERPL-MCNC: 8.9 IU/L
PROLACTIN SERPL-MCNC: 6.3 NG/ML
T4 FREE SERPL-MCNC: 1.4 NG/DL
TSH SERPL-ACNC: 1.54 UIU/ML

## 2024-05-21 ENCOUNTER — TRANSCRIPTION ENCOUNTER (OUTPATIENT)
Age: 34
End: 2024-05-21

## 2024-05-23 ENCOUNTER — TRANSCRIPTION ENCOUNTER (OUTPATIENT)
Age: 34
End: 2024-05-23

## 2024-05-23 DIAGNOSIS — I10 ESSENTIAL (PRIMARY) HYPERTENSION: ICD-10-CM

## 2024-05-23 RX ORDER — LABETALOL HYDROCHLORIDE 200 MG/1
200 TABLET, FILM COATED ORAL
Qty: 270 | Refills: 3 | Status: ACTIVE | COMMUNITY
Start: 2023-07-21 | End: 1900-01-01

## 2024-05-23 RX ORDER — NIFEDIPINE 30 MG/1
30 TABLET, EXTENDED RELEASE ORAL
Qty: 180 | Refills: 3 | Status: DISCONTINUED | COMMUNITY
Start: 1900-01-01 | End: 2024-05-23

## 2024-06-10 ENCOUNTER — TRANSCRIPTION ENCOUNTER (OUTPATIENT)
Age: 34
End: 2024-06-10

## 2024-06-24 PROBLEM — Z01.419 ENCOUNTER FOR ANNUAL ROUTINE GYNECOLOGICAL EXAMINATION: Status: ACTIVE | Noted: 2024-06-24

## 2024-06-24 NOTE — DISCUSSION/SUMMARY
[FreeTextEntry1] : 35 y/o  LMP 24 presents for ANNUAL visit.    #Well Woman Visit 1. Nutrition/Activity: The benefits of balanced diet and physical activity discussed with patient.  2. Health Screening: She was informed of the benefits of a screening colonoscopy/DEXA/Mammo/Pap. - Cervix: We reviewed ASCCP/ACOG guidelines for pap smear screening. Last PAP Dec 2022, NILM HPV NEG, patient desires 3yr interval screening, due Dec 2025.  3. Sex Health:  The importance of safe-sex practices was discussed with the patient. STD screening was offered to patient, she defers.  4. Contraception: does not desire contraception, counseled about safe sex practices and barrier protection use. 5. hot flashes, night sweats, discussed possible hormonal changes due to recent delivery in 2023, unlikely menopause, discussed role for FSH/E2/PRL/TSH, she is agreeable, collected   She verbalized understanding and agreement with above counseling regarding differential diagnosis, evaluation, and plan.  She was given time for questions/concerns which were all answered to her apparent satisfaction. All designated lab work for today drawn in office.   RTO 1yr for next GYN ANNUAL

## 2024-06-24 NOTE — HISTORY OF PRESENT ILLNESS
[Y] : Positive pregnancy history [Menarche Age: ____] : age at menarche was [unfilled] [No] : Patient does not have concerns regarding sex [Currently Active] : currently active [Men] : men [Yes] : Yes [Condoms] : Condoms [PapSmeardate] : 12/15/22 [TextBox_31] : NEG  [HPVDate] : 12/15/22 [TextBox_78] : NEG  [LMPDate] : 05/04/24 [PGHxTotal] : 2 [[facility] : 2 [[facility] : 2 [FreeTextEntry1] : 05/04/22

## 2024-12-19 ENCOUNTER — OUTPATIENT (OUTPATIENT)
Dept: OUTPATIENT SERVICES | Facility: HOSPITAL | Age: 34
LOS: 1 days | End: 2024-12-19
Payer: COMMERCIAL

## 2024-12-19 DIAGNOSIS — Z98.890 OTHER SPECIFIED POSTPROCEDURAL STATES: Chronic | ICD-10-CM

## 2024-12-19 DIAGNOSIS — R10.9 UNSPECIFIED ABDOMINAL PAIN: ICD-10-CM

## 2024-12-19 PROCEDURE — 74177 CT ABD & PELVIS W/CONTRAST: CPT

## 2024-12-19 PROCEDURE — 74177 CT ABD & PELVIS W/CONTRAST: CPT | Mod: 26

## 2025-03-12 ENCOUNTER — NON-APPOINTMENT (OUTPATIENT)
Age: 35
End: 2025-03-12